# Patient Record
Sex: FEMALE | Race: WHITE | NOT HISPANIC OR LATINO | Employment: FULL TIME | URBAN - METROPOLITAN AREA
[De-identification: names, ages, dates, MRNs, and addresses within clinical notes are randomized per-mention and may not be internally consistent; named-entity substitution may affect disease eponyms.]

---

## 2020-08-10 RX ORDER — MELOXICAM 7.5 MG/1
7.5 TABLET ORAL DAILY
COMMUNITY
End: 2021-08-26

## 2020-08-10 RX ORDER — GABAPENTIN 300 MG/1
300 CAPSULE ORAL 3 TIMES DAILY
COMMUNITY

## 2020-08-10 RX ORDER — CYCLOBENZAPRINE HCL 5 MG
5 TABLET ORAL 3 TIMES DAILY PRN
COMMUNITY
End: 2020-08-13 | Stop reason: SDUPTHER

## 2020-08-13 ENCOUNTER — OFFICE VISIT (OUTPATIENT)
Dept: OBSTETRICS AND GYNECOLOGY | Facility: CLINIC | Age: 65
End: 2020-08-13
Payer: MEDICARE

## 2020-08-13 VITALS — HEIGHT: 69 IN | BODY MASS INDEX: 30.81 KG/M2 | WEIGHT: 208 LBS

## 2020-08-13 DIAGNOSIS — Z12.31 BREAST CANCER SCREENING BY MAMMOGRAM: ICD-10-CM

## 2020-08-13 DIAGNOSIS — Z01.419 ENCOUNTER FOR WELL WOMAN EXAM WITH ROUTINE GYNECOLOGICAL EXAM: Primary | ICD-10-CM

## 2020-08-13 PROCEDURE — G0101 PR CA SCREEN;PELVIC/BREAST EXAM: ICD-10-PCS | Mod: S$GLB,,, | Performed by: OBSTETRICS & GYNECOLOGY

## 2020-08-13 PROCEDURE — G0101 CA SCREEN;PELVIC/BREAST EXAM: HCPCS | Mod: S$GLB,,, | Performed by: OBSTETRICS & GYNECOLOGY

## 2020-08-13 RX ORDER — LISINOPRIL 10 MG/1
10 TABLET ORAL DAILY
COMMUNITY
Start: 2020-07-21

## 2020-08-13 RX ORDER — ESOMEPRAZOLE MAGNESIUM 40 MG/1
40 CAPSULE, DELAYED RELEASE ORAL
COMMUNITY
End: 2022-05-09 | Stop reason: ALTCHOICE

## 2020-08-13 NOTE — PROGRESS NOTES
CC: Well Woman -hysterectomy with BSO    HPI:  Patient is a 65 y.o.   who presents for her well woman exam today.  History reviewed with patient and changes noted.  Patient without complaints or concerns today.    Past Medical History:   Diagnosis Date    Arthritis     GERD (gastroesophageal reflux disease)     HTN (hypertension)     Pancytopenia        Past Surgical History:   Procedure Laterality Date    APPENDECTOMY       SECTION      TOTAL ABDOMINAL HYSTERECTOMY W/ BILATERAL SALPINGOOPHORECTOMY      2nd to fibroids    TOTAL KNEE ARTHROPLASTY Right     knee replacement       Social History     Tobacco Use    Smoking status: Never Smoker    Smokeless tobacco: Never Used   Substance Use Topics    Alcohol use: Not Currently    Drug use: Not on file       Family History   Problem Relation Age of Onset    Breast cancer Mother 56    Hypertension Mother     Diabetes Mother        Review of patient's allergies indicates:   Allergen Reactions    Pcn [penicillins]          Current Outpatient Medications:     gabapentin (NEURONTIN) 300 MG capsule, Take 300 mg by mouth 3 (three) times daily., Disp: , Rfl:     meloxicam (MOBIC) 7.5 MG tablet, Take 7.5 mg by mouth once daily., Disp: , Rfl:     esomeprazole (NEXIUM) 40 MG capsule, Take 40 mg by mouth., Disp: , Rfl:     lisinopriL 10 MG tablet, Take 10 mg by mouth once daily., Disp: , Rfl:     OB History        2    Para   2    Term   2            AB        Living   2       SAB        TAB        Ectopic        Multiple        Live Births   2                  GYN HX:    HX ABNL PAPS:  no abnormals    HX OF STDS:  none    HRT USE: never used    HYSTERECTOMY SINCE:  for fibroids with BSO    HEALTH MAINTENANCE:  (PCP-Primary Doctor No)    LAST ANNUAL/ PAP:   July 3, 2019       LAST PAP:  neg    LAST MMG:  2020 normal at [facility]     LAST LABS:  just recently with specialist    LAST COLON: - neg- Q 10 yrs- w/ pt cant remember  "name of MD    LAST DEXA: 2011 normal at [facility]    ROS:  Review of Systems   Constitutional: Negative for activity change, appetite change, chills, fatigue, fever and unexpected weight change.   Respiratory: Negative for cough and shortness of breath.    Cardiovascular: Negative for chest pain and leg swelling.   Gastrointestinal: Negative for abdominal pain, bloating, blood in stool, constipation, diarrhea, nausea and vomiting.   Endocrine: Negative for hair loss and hot flashes.   Genitourinary: Negative for decreased libido, dyspareunia, dysuria, flank pain, frequency, genital sores, hematuria, pelvic pain, urgency, vaginal bleeding, vaginal discharge, vaginal pain, urinary incontinence, vaginal dryness and vaginal odor.   Musculoskeletal: Negative for arthralgias and joint swelling.   Integumentary:  Negative for rash, acne, mole/lesion, breast mass, nipple discharge, breast skin changes and breast tenderness.   Neurological: Negative for headaches.   Psychiatric/Behavioral: Negative for depression and sleep disturbance. The patient is not nervous/anxious.    Breast: Negative for asymmetry, lump, mass, nipple discharge, skin changes and tenderness      VITALS:  No LMP recorded. Patient has had a hysterectomy.  Vitals:    08/13/20 1516   Weight: 94.3 kg (208 lb)   Height: 5' 9" (1.753 m)     Body mass index is 30.72 kg/m².     PHYSICAL EXAM:  Physical Exam:   Constitutional: She is oriented to person, place, and time. She appears well-developed and well-nourished. No distress.    HENT:   Head: Normocephalic and atraumatic.     Neck: No thyroid mass present.    Cardiovascular: Normal rate.     Pulmonary/Chest: Effort normal. No respiratory distress. She exhibits no deformity. Right breast exhibits no inverted nipple, no mass, no nipple discharge, no skin change, no tenderness, no bleeding and no swelling. Left breast exhibits no inverted nipple, no mass, no nipple discharge, no skin change, no tenderness, no " bleeding and no swelling. Breasts are symmetrical.        Abdominal: Soft. Normal appearance. She exhibits no distension. There is no abdominal tenderness.     Genitourinary:    Vagina normal.      Pelvic exam was performed with patient supine.   There is no rash, tenderness, lesion or injury on the right labia. There is no rash, tenderness, lesion or injury on the left labia. Uterus is absent. Right adnexum displays no mass, no tenderness and no fullness. Left adnexum displays no mass, no tenderness and no fullness. No erythema, tenderness, bleeding, rectocele, cystocele or unspecified prolapse of vaginal walls in the vagina.    No foreign body in the vagina.      No signs of injury in the vagina.   Vaginal cuff normal.Labial bartholins normal.Cervix exhibits absence. negative for vaginal discharge              Neurological: She is alert and oriented to person, place, and time.    Skin: Skin is warm and dry. No lesion and no rash noted.    Psychiatric: She has a normal mood and affect. Her speech is normal and behavior is normal. Judgment and thought content normal.     *female chaperone present for exam    ASSESSMENT and PLAN:  Encounter for well woman exam with routine gynecological exam    Breast cancer screening by mammogram       FOLLOWUP:  Follow up in about 1 year (around 8/13/2021) for wwe.     COUNSELING:  Patient was counseled today on A.C.S. Pap guidelines and recommendations for yearly pelvic exam, monthly self breast exams, annual mammograms, and screening colonoscopy starting at age 50. Encouraged patient to see her PCP for other health maintenance.

## 2021-02-11 ENCOUNTER — IMMUNIZATION (OUTPATIENT)
Dept: HEMATOLOGY/ONCOLOGY | Facility: CLINIC | Age: 66
End: 2021-02-11
Payer: MEDICARE

## 2021-02-11 DIAGNOSIS — Z23 NEED FOR VACCINATION: Primary | ICD-10-CM

## 2021-02-11 PROCEDURE — 0001A COVID-19, MRNA, LNP-S, PF, 30 MCG/0.3 ML DOSE VACCINE: CPT | Mod: S$GLB,,, | Performed by: FAMILY MEDICINE

## 2021-02-11 PROCEDURE — 91300 COVID-19, MRNA, LNP-S, PF, 30 MCG/0.3 ML DOSE VACCINE: ICD-10-PCS | Mod: S$GLB,,, | Performed by: FAMILY MEDICINE

## 2021-02-11 PROCEDURE — 91300 COVID-19, MRNA, LNP-S, PF, 30 MCG/0.3 ML DOSE VACCINE: CPT | Mod: S$GLB,,, | Performed by: FAMILY MEDICINE

## 2021-02-11 PROCEDURE — 0001A COVID-19, MRNA, LNP-S, PF, 30 MCG/0.3 ML DOSE VACCINE: ICD-10-PCS | Mod: S$GLB,,, | Performed by: FAMILY MEDICINE

## 2021-03-04 ENCOUNTER — IMMUNIZATION (OUTPATIENT)
Dept: HEMATOLOGY/ONCOLOGY | Facility: CLINIC | Age: 66
End: 2021-03-04
Payer: MEDICARE

## 2021-03-04 DIAGNOSIS — Z23 NEED FOR VACCINATION: Primary | ICD-10-CM

## 2021-03-04 PROCEDURE — 0002A COVID-19, MRNA, LNP-S, PF, 30 MCG/0.3 ML DOSE VACCINE: CPT | Mod: CV19,S$GLB,, | Performed by: FAMILY MEDICINE

## 2021-03-04 PROCEDURE — 91300 COVID-19, MRNA, LNP-S, PF, 30 MCG/0.3 ML DOSE VACCINE: ICD-10-PCS | Mod: S$GLB,,, | Performed by: FAMILY MEDICINE

## 2021-03-04 PROCEDURE — 0002A COVID-19, MRNA, LNP-S, PF, 30 MCG/0.3 ML DOSE VACCINE: ICD-10-PCS | Mod: CV19,S$GLB,, | Performed by: FAMILY MEDICINE

## 2021-03-04 PROCEDURE — 91300 COVID-19, MRNA, LNP-S, PF, 30 MCG/0.3 ML DOSE VACCINE: CPT | Mod: S$GLB,,, | Performed by: FAMILY MEDICINE

## 2021-07-01 ENCOUNTER — TELEPHONE (OUTPATIENT)
Dept: OBSTETRICS AND GYNECOLOGY | Facility: CLINIC | Age: 66
End: 2021-07-01

## 2021-07-08 ENCOUNTER — TELEPHONE (OUTPATIENT)
Dept: PULMONOLOGY | Facility: CLINIC | Age: 66
End: 2021-07-08

## 2021-07-08 DIAGNOSIS — R09.1 PLEURISY: Primary | ICD-10-CM

## 2021-08-26 ENCOUNTER — OFFICE VISIT (OUTPATIENT)
Dept: OBSTETRICS AND GYNECOLOGY | Facility: CLINIC | Age: 66
End: 2021-08-26
Payer: MEDICARE

## 2021-08-26 VITALS
BODY MASS INDEX: 31.52 KG/M2 | HEIGHT: 68 IN | HEART RATE: 73 BPM | DIASTOLIC BLOOD PRESSURE: 75 MMHG | WEIGHT: 208 LBS | SYSTOLIC BLOOD PRESSURE: 130 MMHG

## 2021-08-26 DIAGNOSIS — Z78.0 ENCOUNTER FOR OSTEOPOROSIS SCREENING IN ASYMPTOMATIC POSTMENOPAUSAL PATIENT: ICD-10-CM

## 2021-08-26 DIAGNOSIS — Z12.31 BREAST CANCER SCREENING BY MAMMOGRAM: Primary | ICD-10-CM

## 2021-08-26 DIAGNOSIS — N95.2 ATROPHIC VAGINITIS: ICD-10-CM

## 2021-08-26 DIAGNOSIS — N39.0 RECURRENT UTI: ICD-10-CM

## 2021-08-26 DIAGNOSIS — Z13.820 ENCOUNTER FOR OSTEOPOROSIS SCREENING IN ASYMPTOMATIC POSTMENOPAUSAL PATIENT: ICD-10-CM

## 2021-08-26 PROCEDURE — 99213 PR OFFICE/OUTPT VISIT, EST, LEVL III, 20-29 MIN: ICD-10-PCS | Mod: 25,S$GLB,, | Performed by: OBSTETRICS & GYNECOLOGY

## 2021-08-26 PROCEDURE — G0101 CA SCREEN;PELVIC/BREAST EXAM: HCPCS | Mod: GZ,S$GLB,, | Performed by: OBSTETRICS & GYNECOLOGY

## 2021-08-26 PROCEDURE — 99213 OFFICE O/P EST LOW 20 MIN: CPT | Mod: 25,S$GLB,, | Performed by: OBSTETRICS & GYNECOLOGY

## 2021-08-26 PROCEDURE — G0101 PR CA SCREEN;PELVIC/BREAST EXAM: ICD-10-PCS | Mod: GZ,S$GLB,, | Performed by: OBSTETRICS & GYNECOLOGY

## 2021-08-26 RX ORDER — CYCLOBENZAPRINE HCL 10 MG
10 TABLET ORAL NIGHTLY
COMMUNITY
Start: 2021-08-10

## 2021-08-26 RX ORDER — LATANOPROST 50 UG/ML
SOLUTION/ DROPS OPHTHALMIC
COMMUNITY
Start: 2021-07-16

## 2021-08-26 RX ORDER — TIZANIDINE 2 MG/1
2 TABLET ORAL EVERY 8 HOURS PRN
COMMUNITY
Start: 2021-06-24 | End: 2022-05-09 | Stop reason: ALTCHOICE

## 2021-08-26 RX ORDER — ATORVASTATIN CALCIUM 40 MG/1
TABLET, FILM COATED ORAL
COMMUNITY
Start: 2021-08-15

## 2021-08-26 RX ORDER — ESTRADIOL 0.1 MG/G
1 CREAM VAGINAL
Qty: 42.5 G | Refills: 4 | Status: SHIPPED | OUTPATIENT
Start: 2021-08-26 | End: 2021-09-25

## 2021-08-26 RX ORDER — FERROUS SULFATE TAB 325 MG (65 MG ELEMENTAL FE) 325 (65 FE) MG
1 TAB ORAL 2 TIMES DAILY
COMMUNITY
Start: 2021-07-07 | End: 2022-05-09 | Stop reason: ALTCHOICE

## 2021-08-26 RX ORDER — OXYCODONE AND ACETAMINOPHEN 7.5; 325 MG/1; MG/1
TABLET ORAL
COMMUNITY
Start: 2021-08-12 | End: 2022-05-09

## 2021-08-26 RX ORDER — TRAMADOL HYDROCHLORIDE 50 MG/1
50 TABLET ORAL EVERY 6 HOURS PRN
COMMUNITY
Start: 2021-08-18

## 2022-02-07 ENCOUNTER — OFFICE VISIT (OUTPATIENT)
Dept: PULMONOLOGY | Facility: CLINIC | Age: 67
End: 2022-02-07
Payer: MEDICARE

## 2022-02-07 VITALS
BODY MASS INDEX: 32.42 KG/M2 | DIASTOLIC BLOOD PRESSURE: 64 MMHG | OXYGEN SATURATION: 95 % | RESPIRATION RATE: 18 BRPM | WEIGHT: 213.19 LBS | SYSTOLIC BLOOD PRESSURE: 154 MMHG

## 2022-02-07 DIAGNOSIS — J94.8 CALCIFIED PLEURAL PLAQUE ON CHEST X-RAY: Primary | ICD-10-CM

## 2022-02-07 PROCEDURE — 99203 OFFICE O/P NEW LOW 30 MIN: CPT | Mod: S$GLB,,, | Performed by: INTERNAL MEDICINE

## 2022-02-07 PROCEDURE — 99203 PR OFFICE/OUTPT VISIT, NEW, LEVL III, 30-44 MIN: ICD-10-PCS | Mod: S$GLB,,, | Performed by: INTERNAL MEDICINE

## 2022-02-07 NOTE — PROGRESS NOTES
Subjective:    Patient Identification:   Patient ID: Silvina Morgan is a 67 y.o. female.    Referring Provider:  Referred by ER for abnormal CT scan     Chief Complaint:  Abnormal CT scan    History of Present Illness:    Silvina Morgan is a 67 y.o. female who presents for the evaluation of above-mentioned.  The patient mentions that there is a chronic spot on her lungs which was found out when she had a PPD flare-up 15 years ago resulting in a chest x-ray.  She never had any respiratory symptoms.  He 90s she had pleurisy and saw several doctors until seen by Dr. Schultz who made a diagnosis of pleurisy and put her on some medications that she does not remember but her symptoms completely went away.  He denies any history of trauma or any procedures performed on the right side of her chest.  He denies any personal history of tuberculosis.  Eight years ago she was thought to have rheumatoid arthritis but it turned out she did not have rheumatoid arthritis but osteoarthritis.  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  She mentioned that she was in an old building at [address] [place] [address] for about 20 years but does not recall definitive asbestos exposure.  She had a preop chest x-ray for some surgical procedure and was found to have suspected pneumothorax and sent to ER.  At that time she had no symptoms.  This was in June of last year and she ended up getting a CT scan of the chest in ER which showed right hemithorax anterior larger than posterior calcified pleural plaque.  She was referred to [facility] for further evaluation of that finding.    Review of Systems:  Review of Systems   Constitutional: Negative for fever, chills, weight loss, weight gain, activity change, appetite change, fatigue, night sweats and weakness.   HENT: Negative for nosebleeds, postnasal drip, rhinorrhea, sinus pressure, sore throat, trouble swallowing, voice change, congestion, ear  pain and hearing loss.    Eyes: Negative for redness and itching.   Respiratory: Negative for cough, hemoptysis, sputum production, choking, chest tightness, shortness of breath, wheezing, orthopnea, previous hospitialization due to pulmonary problems, asthma nighttime symptoms, pleurisy, dyspnea on extertion, use of rescue inhaler and Paroxysmal Nocturnal Dyspnea.    Cardiovascular: Negative for chest pain, palpitations and leg swelling.   Genitourinary: Negative for difficulty urinating and hematuria.   Endocrine: Negative for polydipsia, polyphagia, cold intolerance, heat intolerance and polyuria.    Musculoskeletal: Negative for arthralgias, back pain, gait problem, joint swelling and myalgias.   Skin: Negative for rash.   Gastrointestinal: Negative for nausea, vomiting, abdominal pain, abdominal distention and acid reflux.   Neurological: Negative for dizziness, syncope, weakness, light-headedness and headaches.   Hematological: Negative for adenopathy. Does not bruise/bleed easily and no excessive bruising.   Psychiatric/Behavioral: Negative for confusion and sleep disturbance. The patient is not nervous/anxious.          Allergies:   Review of patient's allergies indicates:   Allergen Reactions    Pcn [penicillins]        Medications:      Past Medical History:      Past Medical History:   Diagnosis Date    Arthritis     GERD (gastroesophageal reflux disease)     HTN (hypertension)     Pancytopenia 2011    Postmenopausal        Family History:      Family History   Problem Relation Age of Onset    Breast cancer Mother 56    Hypertension Mother     Diabetes Mother         Social History:      Past Surgical History:   Procedure Laterality Date    APPENDECTOMY       SECTION      TOTAL ABDOMINAL HYSTERECTOMY W/ BILATERAL SALPINGOOPHORECTOMY      2nd to fibroids    TOTAL KNEE ARTHROPLASTY Right     knee replacement    TOTAL KNEE ARTHROPLASTY Left        Physical Exam:  Vitals:    22  1247   BP: (!) 154/64   Resp: 18     Physical Exam   Constitutional: She is oriented to person, place, and time. She appears not cachectic. No distress.   HENT:   Head: Normocephalic.   Right Ear: External ear normal.   Left Ear: External ear normal.   Nose: Nose normal. No mucosal edema. No polyps.   Mouth/Throat: Oropharynx is clear and moist. Normal dentition. No oropharyngeal exudate.   Neck: No JVD present. No tracheal deviation present. No thyromegaly present.   Cardiovascular: Normal rate, regular rhythm, normal heart sounds and intact distal pulses. Exam reveals no gallop and no friction rub.   No murmur heard.  Pulmonary/Chest: Normal expansion, symmetric chest wall expansion, effort normal and breath sounds normal. No stridor. No respiratory distress. She has no decreased breath sounds. She has no wheezes. She has no rhonchi. She has no rales. Chest wall is not dull to percussion. She exhibits no tenderness. Negative for egophony. Negative for tactile fremitus.   Abdominal: Soft. Bowel sounds are normal. She exhibits no distension and no mass. There is no hepatosplenomegaly. There is no abdominal tenderness. There is no rebound and no guarding. No hernia.   Musculoskeletal:         General: No tenderness, deformity or edema. Normal range of motion.      Cervical back: Normal range of motion and neck supple.   Lymphadenopathy: No supraclavicular adenopathy is present.     She has no cervical adenopathy.     She has no axillary adenopathy.   Neurological: She is alert and oriented to person, place, and time. She has normal reflexes. She displays normal reflexes. No cranial nerve deficit. She exhibits normal muscle tone.   Skin: Skin is warm and dry. No rash noted. She is not diaphoretic. No cyanosis or erythema. No pallor. Nails show no clubbing.   Psychiatric: She has a normal mood and affect. Her behavior is normal. Judgment and thought content normal.            Accessory Clinical Data:      CT scan:  CT  scan of the chest from June 2021 was personally reviewed and findings were dictated under HPI.  Right-sided anterior and posterior, anterior greater than posterior calcified pleural plaque without any parenchymal abnormalities.    PFTs:  PFTs showed normal spirometry and do not meet criteria for obstruction or restriction.  Diffusion capacity is mildly reduced but corrects for alveolar volume.    6MWT:  None    TTE:  None available    Lab data:    All radiographic imaging of the chest, PFT tracings/data, and 6MWT data have been independently reviewed and interpreted unless otherwise specified.     Assessment and Plan:        Problem List Items Addressed This Visit    None     Visit Diagnoses     Calcified pleural plaque on chest x-ray    -  Primary         Unilateral anterior more than posterior calcified pleural plaque is probably a calcific sequelae of old pleurisy/pleuritic reaction based upon given history.    I doubt this is asbestosis.  No parenchymal abnormalities seen on lung windows.  Patient is asymptomatic, there is no further evaluation or treatment indicated.    Reassurance was provided to the patient and she was advised to let future providers not to worry about this asymptomatic incidental finding on her imagings.  I have showed those images to the patient in EMR.    Follow up if symptoms worsen or fail to improve.  Thank you very much for involving me in the care of this patient.  Please do not hesitate to reach me if you have any further questions or concerns.    This note is dictated on M*Modal word recognition program.  There are word recognition mistakes that are occasionally missed on review.

## 2022-05-09 ENCOUNTER — OFFICE VISIT (OUTPATIENT)
Dept: UROLOGY | Facility: CLINIC | Age: 67
End: 2022-05-09
Payer: MEDICARE

## 2022-05-09 VITALS
DIASTOLIC BLOOD PRESSURE: 78 MMHG | WEIGHT: 210 LBS | HEIGHT: 68 IN | RESPIRATION RATE: 16 BRPM | SYSTOLIC BLOOD PRESSURE: 138 MMHG | BODY MASS INDEX: 31.83 KG/M2 | TEMPERATURE: 98 F | HEART RATE: 67 BPM

## 2022-05-09 DIAGNOSIS — N39.0 RECURRENT UTI: Primary | ICD-10-CM

## 2022-05-09 LAB — POC RESIDUAL URINE VOLUME: 0 ML (ref 0–100)

## 2022-05-09 PROCEDURE — 99203 PR OFFICE/OUTPT VISIT, NEW, LEVL III, 30-44 MIN: ICD-10-PCS | Mod: S$GLB,,, | Performed by: NURSE PRACTITIONER

## 2022-05-09 PROCEDURE — 51798 US URINE CAPACITY MEASURE: CPT | Mod: S$GLB,,, | Performed by: NURSE PRACTITIONER

## 2022-05-09 PROCEDURE — 51798 POCT BLADDER SCAN: ICD-10-PCS | Mod: S$GLB,,, | Performed by: NURSE PRACTITIONER

## 2022-05-09 PROCEDURE — 99203 OFFICE O/P NEW LOW 30 MIN: CPT | Mod: S$GLB,,, | Performed by: NURSE PRACTITIONER

## 2022-05-09 RX ORDER — FERROUS GLUCONATE 324(38)MG
1 TABLET ORAL DAILY
COMMUNITY
Start: 2022-04-29

## 2022-05-09 RX ORDER — MELOXICAM 15 MG/1
15 TABLET ORAL DAILY PRN
COMMUNITY
Start: 2022-04-27

## 2022-05-09 RX ORDER — PANTOPRAZOLE SODIUM 40 MG/1
40 TABLET, DELAYED RELEASE ORAL DAILY
COMMUNITY
Start: 2022-04-08

## 2022-05-09 RX ORDER — FERROUS SULFATE, DRIED 160(50) MG
1 TABLET, EXTENDED RELEASE ORAL 2 TIMES DAILY WITH MEALS
COMMUNITY

## 2022-05-09 RX ORDER — ERGOCALCIFEROL 1.25 MG/1
50000 CAPSULE ORAL
COMMUNITY

## 2022-05-09 RX ORDER — TIMOLOL MALEATE 5 MG/ML
1 SOLUTION/ DROPS OPHTHALMIC 2 TIMES DAILY
COMMUNITY
Start: 2022-04-26

## 2022-05-09 NOTE — PROGRESS NOTES
Subjective:       Patient ID: Silvina Morgan is a 67 y.o. female.    Chief Complaint: Urinary Tract Infection, Urinary Urgency, and Nocturia (X 2)      HPI: 67-year-old female, new to [facility], referred for recurring UTIs.  Patient states he has been having recurring UTIs.  Patient states he prior had 6 is having UTIs over the last year.  Records show that she had a urine culture in February which showed Klebsiella.  Urine culture in March was showed mixed growth.  Patient states when she gets a UTI she will typically have pain with urination, urgency, and odor to the urine.    At this time patient is doing well no complaints.  She denies any pain or burning urination.  Denies any odor urine.  Denies any fever.  Denies any body aches.  Denies any blood in it.  No other urinary complaints at this time.    Patient states she has used estrogen cream in the past with no improvement.       Past Medical History:   Past Medical History:   Diagnosis Date    Arthritis     Colon polyp     GERD (gastroesophageal reflux disease)     Glaucoma     HTN (hypertension)     Mixed hyperlipidemia     Pancytopenia     Postmenopausal     Spinal stenosis     Urinary tract infection        Past Surgical Historical:   Past Surgical History:   Procedure Laterality Date    APPENDECTOMY       SECTION      colonoscopy with polys      TOTAL ABDOMINAL HYSTERECTOMY W/ BILATERAL SALPINGOOPHORECTOMY      2nd to fibroids    TOTAL KNEE ARTHROPLASTY Right     knee replacement    TOTAL KNEE ARTHROPLASTY Left         Medications:   Medication List with Changes/Refills   Current Medications    ATORVASTATIN (LIPITOR) 40 MG TABLET        CALCIUM-VITAMIN D3 (CALCIUM 500 + D) 500 MG-5 MCG (200 UNIT) PER TABLET    Take 1 tablet by mouth 2 (two) times daily with meals.    CYCLOBENZAPRINE (FLEXERIL) 10 MG TABLET    Take 10 mg by mouth nightly.    ERGOCALCIFEROL (VITAMIN D2) 50,000 UNIT CAP    Take 50,000 Units by mouth every  7 days.    ESTRADIOL (ESTRACE) 0.01 % (0.1 MG/GRAM) VAGINAL CREAM    Place 1 g vaginally twice a week.    FERROUS GLUCONATE (FERGON) 324 MG TABLET    Take 1 tablet by mouth once daily.    GABAPENTIN (NEURONTIN) 300 MG CAPSULE    Take 300 mg by mouth 3 (three) times daily.    LATANOPROST 0.005 % OPHTHALMIC SOLUTION        LISINOPRIL 10 MG TABLET    Take 10 mg by mouth once daily.    MELOXICAM (MOBIC) 15 MG TABLET    Take 15 mg by mouth daily as needed.    OXYCODONE-ACETAMINOPHEN (PERCOCET) 7.5-325 MG PER TABLET    TAKE 1 TABLET BY MOUTH EVERY 6 HOURS AS NEEDED FOR PAIN FOR 7 DAYS    PANTOPRAZOLE (PROTONIX) 40 MG TABLET    Take 40 mg by mouth once daily.    TIMOLOL MALEATE 0.5% (TIMOPTIC) 0.5 % DROP    Place 1 drop into both eyes 2 (two) times daily.    TIZANIDINE (ZANAFLEX) 2 MG TABLET    Take 2 mg by mouth every 8 (eight) hours as needed. FOR MUSCLE SPASM    TRAMADOL (ULTRAM) 50 MG TABLET    Take 50 mg by mouth every 6 (six) hours as needed.   Discontinued Medications    ESOMEPRAZOLE (NEXIUM) 40 MG CAPSULE    Take 40 mg by mouth.    FEROSUL 325 MG (65 MG IRON) TAB TABLET    Take 1 tablet by mouth 2 (two) times daily.        Past Social History:   Social History     Socioeconomic History    Marital status:    Tobacco Use    Smoking status: Never Smoker    Smokeless tobacco: Never Used   Substance and Sexual Activity    Alcohol use: Not Currently    Drug use: Never    Sexual activity: Not Currently       Allergies:   Review of patient's allergies indicates:   Allergen Reactions    Pcn [penicillins]         Family History:   Family History   Problem Relation Age of Onset    Breast cancer Mother 56    Hypertension Mother     Diabetes Mother         Review of Systems:  Review of Systems   Constitutional: Negative for activity change and appetite change.   HENT: Negative for congestion and dental problem.    Respiratory: Negative for chest tightness and shortness of breath.    Cardiovascular: Negative for  chest pain.   Gastrointestinal: Negative for abdominal distention.   Genitourinary: Negative for decreased urine volume, difficulty urinating, dyspareunia, dysuria, enuresis, flank pain, frequency, genital sores, hematuria, pelvic pain and urgency.   Musculoskeletal: Negative for back pain and neck pain.   Allergic/Immunologic: Negative for immunocompromised state.   Neurological: Negative for dizziness.   Hematological: Negative for adenopathy.   Psychiatric/Behavioral: Negative for agitation, behavioral problems and confusion.       Physical Exam:  Physical Exam  Vitals and nursing note reviewed.   Constitutional:       Appearance: She is well-developed.   HENT:      Head: Normocephalic.   Cardiovascular:      Rate and Rhythm: Normal rate and regular rhythm.      Heart sounds: Normal heart sounds.   Pulmonary:      Effort: Pulmonary effort is normal.      Breath sounds: Normal breath sounds.   Abdominal:      General: Bowel sounds are normal.      Palpations: Abdomen is soft.   Skin:     General: Skin is warm and dry.   Neurological:      Mental Status: She is alert and oriented to person, place, and time.       Urinalysis:  Negative leukocytes, negative white blood cells, negative bacteria.    Assessment/Plan:   Recurring UTI:  Will send patient for renal ultrasound.  Discussed maintaining hydration.  Also discussed not holding her bladder.  Patient started notify us for any signs symptoms of infection.    Follow-up pending ultrasound.  Problem List Items Addressed This Visit    None     Visit Diagnoses     Recurrent UTI    -  Primary    Relevant Orders    POCT Bladder Scan    POCT Urinalysis (w/Micro Option)    US Retroperitoneal Complete

## 2022-05-30 ENCOUNTER — TELEPHONE (OUTPATIENT)
Dept: UROLOGY | Facility: CLINIC | Age: 67
End: 2022-05-30
Payer: MEDICARE

## 2022-05-30 NOTE — TELEPHONE ENCOUNTER
Stated she went to urgent care 05/28/22 and was prescribed Cipro and given ABX ineciton. Advised to continue ABX as prescribed and contact office 1-2 days if s/s have not improved. Verbalized understanding.

## 2022-05-30 NOTE — TELEPHONE ENCOUNTER
----- Message from Nel Beyer sent at 5/30/2022  1:32 PM CDT -----  Contact: self  Pt calling to stated she had urgent care visit and another uti that started Saturday.  Pt stated they said it was a bad one and was given a shot and medication.  Pt can be reached at [phone]     Thanks,

## 2022-06-13 ENCOUNTER — TELEPHONE (OUTPATIENT)
Dept: UROLOGY | Facility: CLINIC | Age: 67
End: 2022-06-13
Payer: MEDICARE

## 2022-06-13 NOTE — TELEPHONE ENCOUNTER
Pt notified of US results. She states after a bm, not every time but when she goes to restroom she notes bm on tissue and asked if this could be causing the uti's. I advised it was possible if bm is getting into urethra. I advised she be checked by gyn or pcp for bm leakage.

## 2022-06-13 NOTE — TELEPHONE ENCOUNTER
----- Message from Edd Stark NP sent at 6/13/2022  8:29 AM CDT -----  Ultrasound of the kidneys is normal.    Follow-up for any recurrence of UTI.

## 2022-07-26 RX ORDER — DULOXETIN HYDROCHLORIDE 30 MG/1
CAPSULE, DELAYED RELEASE ORAL
COMMUNITY
Start: 2022-05-19

## 2022-07-26 NOTE — PROGRESS NOTES
FOLLOW UP VISIT- (menop/ hyst with BSO)  Patient Care Team:  Rena Reyes DO as PCP - General (Family Medicine)  Iglesia Willis MD as Consulting Physician (Pulmonary Disease)  Edd Stark NP as Nurse Practitioner (Urology)  Jonathan Dasilva MD (Orthopedic Surgery)  Kong Jung Jr., MD (Physical Medicine and Rehabilitation)  Chadwick De La Rosa MD (Hematology and Oncology)    The patient's last visit with me was on 2021 for wwe    HPI:  Patient is a 67 y.o. who presents for her breast check and followup on atrophic vaginitis. History reviewed with patient. Reports she has had recurrent utis and saw urology and had a neg cysto and kidney eval. Discussed possible daily prophylaxis but has not done that yet.  Pts pcp suggested using estrace cream vaginally to help prevent utis but pt said it didn't help and then noticed that occasionally after a bm, she will several hrs later urinate and wipe and see stool on the tp and was concerned there was a tear/connection between rectum and vagina and bladder. Has not at any other time other than the first time after a bm and has only happened a couple times. Hasnt noticed any dc from vag.    Also has a boil on her left inner thigh that is tender and been there a few days now. Tends to get them recurrently in the same area.     Her hyst was for fibroids in   HRT:  vaginal estrogen but stopped recently  HX ABNL PAPS: none    REVIEW OF PRIOR DATA/ HEALTH MAINTENANCE:  LAST ANNUAL:   2021   LAST LABS- in last few mos with PCP   LAST MMG (screening)- 2021- normal at [facility]    LAST COLONOSCOPY- - neg- Q 10 yrs- Dr at [facility]   LAST DEXA- - osteopenia at [facility]     Past Medical History:   Diagnosis Date    Arthritis     Colon polyp     GERD (gastroesophageal reflux disease)     Glaucoma     HTN (hypertension)     Mixed hyperlipidemia     Pancytopenia 2011    Pleurisy     Post-menopause atrophic vaginitis     HRT     Recurrent UTI     Spinal stenosis     Urinary tract infection      SURGICAL HX:   has a past surgical history that includes Total abdominal hysterectomy w/ bilateral salpingoophorectomy; Appendectomy; Total knee arthroplasty (Right);  section; Total knee arthroplasty (Left); and colonoscopy with polys.    SOCIAL HX:    reports that she has never smoked. She has never used smokeless tobacco. She reports previous alcohol use. She reports that she does not use drugs.    FAMILY HX:   family history includes Breast cancer (age of onset: 56) in her mother; Diabetes in her mother; Hypertension in her mother; No Known Problems in her brother, father, maternal grandfather, maternal grandmother, paternal grandfather, paternal grandmother, and sister. .    ALLERGIES:  Pcn [penicillins]    Current Outpatient Medications:     atorvastatin (LIPITOR) 40 MG tablet, , Disp: , Rfl:     calcium-vitamin D3 (OS-MAULIK 500 + D3) 500 mg-5 mcg (200 unit) per tablet, Take 1 tablet by mouth 2 (two) times daily with meals., Disp: , Rfl:     cyclobenzaprine (FLEXERIL) 10 MG tablet, Take 10 mg by mouth nightly., Disp: , Rfl:     DULoxetine (CYMBALTA) 30 MG capsule, TAKE 1 CAPSULE BY MOUTH ONCE DAILY WITH MEALS INCREASE AFTER 7 DAYS TO 2 DAILY, Disp: , Rfl:     ferrous gluconate (FERGON) 324 MG tablet, Take 1 tablet by mouth once daily., Disp: , Rfl:     gabapentin (NEURONTIN) 300 MG capsule, Take 300 mg by mouth 3 (three) times daily., Disp: , Rfl:     latanoprost 0.005 % ophthalmic solution, , Disp: , Rfl:     lisinopriL 10 MG tablet, Take 10 mg by mouth once daily., Disp: , Rfl:     meloxicam (MOBIC) 15 MG tablet, Take 15 mg by mouth daily as needed., Disp: , Rfl:     pantoprazole (PROTONIX) 40 MG tablet, Take 40 mg by mouth once daily., Disp: , Rfl:     timolol maleate 0.5% (TIMOPTIC) 0.5 % Drop, Place 1 drop into both eyes 2 (two) times daily., Disp: , Rfl:     ergocalciferol (ERGOCALCIFEROL) 50,000 unit Cap, Take 50,000  "Units by mouth every 7 days., Disp: , Rfl:     estradioL (ESTRACE) 0.01 % (0.1 mg/gram) vaginal cream, Place 1 g vaginally twice a week., Disp: 42.5 g, Rfl: 4    sulfamethoxazole-trimethoprim 800-160mg (BACTRIM DS) 800-160 mg Tab, Take 1 tablet by mouth 2 (two) times daily. for 7 days, Disp: 14 tablet, Rfl: 0    traMADoL (ULTRAM) 50 mg tablet, Take 50 mg by mouth every 6 (six) hours as needed., Disp: , Rfl:     ROS:  CONST:  No fever, chills, fatigue or unexpected changes in weight  CV: No chest pain or palpitations  RESP:  No shortness of breath or cough  GI: No abd pain, vomiting, diarrhea, blood in stool, or changes in bowel mvmts  SKIN: No rashes +boil on thigh  MUSCULOSKELETAL: No joint swelling or pain  PSYCH: No changes in mood or insomia  BREASTS: No asymmetry, lumps, pain, nipple discharge, or skin changes  :  +urinary urgency and frequency          No vag dc, itching, odor or dryness          No pelvic pain, dyspareunia, or abnormal vaginal bleeding    VITALS:  Blood pressure 139/85, pulse 66, height 5' 8" (1.727 m), weight 96.1 kg (211 lb 12.8 oz).  Body mass index is 32.2 kg/m².     PHYSICAL EXAM-  APPEARANCE: Well appearing, in no acute distress.  NECK: Neck symmetric without masses or thyromegaly.  CV:  Normal rate  PULM: Normal resp rate, no resp distress, normal resp effort  PSYCH:  Normal mood and affect, cooperative  SKIN: No rashes or abnormal bruising +2cm area on left inner thigh that was swollen and erythematous c/w folliculitis but no fluctuance  LYMPH: No inguinal or axillary adenopathy  ABD: Soft, without tenderness or masses. No palpable hernias or hsm  BREASTS: Symmetrical, no skin changes or lesions. No palpable masses, tenderness, or nipple dc  PELVIC:  VULVA: No lesions. Normal female genitalia.  URETHRAL MEATUS: No masses, no prolapse.  BLADDER/ URETHRA: No masses or suprapubic tenderness  VAGINA/ CUFF: No lesions. No significant cystocele/ rectocele. +atrophic changes +small " amout of pale yellow discharge at the upper vagina and some erythema. No stool in vagina  PELVIS: No masses, tenderness, or fullness on bimanual exam  ANUS/ PERINEUM: Normal tone.  No lesions.  *female chaperone present for entire exam    ASSESSMENT and PLAN:  Breast cancer screening by mammogram  -     Mammo Digital Screening Bilat w/ Jonah; Future; Expected date: 08/09/2022    Atrophic vaginitis    Menopausal state    Folliculitis  -     sulfamethoxazole-trimethoprim 800-160mg (BACTRIM DS) 800-160 mg Tab; Take 1 tablet by mouth 2 (two) times daily. for 7 days  Dispense: 14 tablet; Refill: 0    Recurrent UTI  -     Urine culture    Acute vaginitis   - pt will consider restarting estrace vag cream 2x a week     FOLLOWUP:  1 year for wwe or sooner prn    COUNSELING:  Patient was counseled today on recommendations for yearly pelvic exam, current Pap guidelines, self breast exams, annual screening mammograms, routine screening colonoscopy, and screening bone density. Reviewed calcium and vitamin D supplements and weight bearing exercise to minimize risks.  Encouraged patient to see her PCP for other health maintenance.

## 2022-07-27 ENCOUNTER — OFFICE VISIT (OUTPATIENT)
Dept: OBSTETRICS AND GYNECOLOGY | Facility: CLINIC | Age: 67
End: 2022-07-27
Payer: MEDICARE

## 2022-07-27 VITALS
HEART RATE: 66 BPM | WEIGHT: 211.81 LBS | HEIGHT: 68 IN | DIASTOLIC BLOOD PRESSURE: 85 MMHG | BODY MASS INDEX: 32.1 KG/M2 | SYSTOLIC BLOOD PRESSURE: 139 MMHG

## 2022-07-27 DIAGNOSIS — N39.0 RECURRENT UTI: ICD-10-CM

## 2022-07-27 DIAGNOSIS — N76.0 ACUTE VAGINITIS: ICD-10-CM

## 2022-07-27 DIAGNOSIS — L73.9 FOLLICULITIS: ICD-10-CM

## 2022-07-27 DIAGNOSIS — N95.2 ATROPHIC VAGINITIS: ICD-10-CM

## 2022-07-27 DIAGNOSIS — N95.1 MENOPAUSAL STATE: ICD-10-CM

## 2022-07-27 DIAGNOSIS — Z12.31 BREAST CANCER SCREENING BY MAMMOGRAM: Primary | ICD-10-CM

## 2022-07-27 PROCEDURE — 99214 OFFICE O/P EST MOD 30 MIN: CPT | Mod: S$GLB,,, | Performed by: OBSTETRICS & GYNECOLOGY

## 2022-07-27 PROCEDURE — 99214 PR OFFICE/OUTPT VISIT, EST, LEVL IV, 30-39 MIN: ICD-10-PCS | Mod: S$GLB,,, | Performed by: OBSTETRICS & GYNECOLOGY

## 2022-07-27 RX ORDER — SULFAMETHOXAZOLE AND TRIMETHOPRIM 800; 160 MG/1; MG/1
1 TABLET ORAL 2 TIMES DAILY
Qty: 14 TABLET | Refills: 0 | Status: SHIPPED | OUTPATIENT
Start: 2022-07-27 | End: 2022-08-03

## 2022-07-29 ENCOUNTER — PATIENT MESSAGE (OUTPATIENT)
Dept: OBSTETRICS AND GYNECOLOGY | Facility: CLINIC | Age: 67
End: 2022-07-29
Payer: MEDICARE

## 2022-07-29 LAB — URINE CULTURE, ROUTINE: NORMAL

## 2022-07-29 NOTE — PROGRESS NOTES
Please let pt know her urine culture didn't grow out any bacteria that are associated with uti, however it grew a small amt of bacteria that live on the skin so it may not have been a really good clean catch. She has recurrent utis and sees urology already. I did give her some bactrim for a boil yesterdays or even if there was anything there, that would normally cover it so I wouldn't say she needs to repeat the ua at this point

## 2022-08-01 ENCOUNTER — TELEPHONE (OUTPATIENT)
Dept: OBSTETRICS AND GYNECOLOGY | Facility: CLINIC | Age: 67
End: 2022-08-01
Payer: MEDICARE

## 2023-08-29 NOTE — PROGRESS NOTES
FOLLOW UP VISIT - menop/ hyst with BSO  Patient Care Team:  Rena Reyes DO as PCP - General (Family Medicine)  Iglesia Willis MD as Consulting Physician (Pulmonary Disease)  Edd Stark NP as Nurse Practitioner (Urology)  Jonathan Dasilva MD (Orthopedic Surgery)  Kong Jung Jr., MD (Physical Medicine and Rehabilitation)  Chadwick De La Rosa MD (Hematology and Oncology)    CC:  breast check, menopausal state, urinary urgency, wants rx for contrave  HPI:  Pt is a 68 y.o.  who is here for her breast check and is having some urinary urgency and sometimes urge incontinence for last several months. Not burning and normally is when she is ready to go to bed. No dysuria and had ua about a month ago and was normal and this was already going on prior to that. Causing her not to sleep well and keeps her up at night. Has not been able to identify any other factors like dietary changes, water consumption, caffeine, etc that makes it better or  worse.  Was put on macrodantin qhs as prophylaxis for recurrent utis but hasnt had one recently. Also wants a rx for contrave to help her lose weight    The patient's last visit with me was on 2022 for followup    REVIEW OF PRIOR DATA/ HEALTH MAINTENANCE:  LAST ANNUAL:   2021   LAST LABS- up to date with PCP    LAST MMG (screening)- 2022- normal at [facility]     LAST COLONOSCOPY- - by unknown Dr Ana key 10 yrs (neg)   LAST DEXA- - osteopenia at [facility]     HX ABNL PAPS: none    HYST for fibroids in   HRT:  used vaginal atrophy meds  in the past    Past Medical History:   Diagnosis Date    Arthritis     Colon polyp     GERD (gastroesophageal reflux disease)     Glaucoma     HTN (hypertension)     Mixed hyperlipidemia     Pancytopenia     Pleurisy     Post-menopause atrophic vaginitis     HRT    Recurrent UTI     Spinal stenosis     Urinary tract infection      SURGICAL HX:   has a past surgical history that includes Total  abdominal hysterectomy w/ bilateral salpingoophorectomy; Appendectomy; Total knee arthroplasty (Right);  section; Total knee arthroplasty (Left); and colonoscopy with polys.  Family, obstetric, and social history reviewed and updated    Current Outpatient Medications   Medication Instructions    atorvastatin (LIPITOR) 40 MG tablet No dose, route, or frequency recorded.    calcium-vitamin D3 (OS-MAULIK 500 + D3) 500 mg-5 mcg (200 unit) per tablet 1 tablet, Oral, 2 times daily with meals    cloNIDine (CATAPRES) 0.1 MG tablet TAKE 1 TABLET BY MOUTH EVERY 8 HOURS AS NEEDED FOR SYSTOLIC BLOOD PRESSURE GREATER THAN 180 OR DIASTOLIC BLOOD PRESSURE GREATER THAN 100    cyclobenzaprine (FLEXERIL) 10 mg, Oral, Nightly    dorzolamide-timolol 2-0.5% (COSOPT) 22.3-6.8 mg/mL ophthalmic solution 1 drop, Both Eyes, 2 times daily    DULoxetine (CYMBALTA) 30 MG capsule TAKE 1 CAPSULE BY MOUTH ONCE DAILY WITH MEALS INCREASE AFTER 7 DAYS TO 2 DAILY    ergocalciferol (ERGOCALCIFEROL) 50,000 Units, Oral, Every 7 days    estradioL (ESTRACE) 1 g, Vaginal, Twice weekly    ferrous gluconate (FERGON) 324 MG tablet 1 tablet, Oral, Daily    gabapentin (NEURONTIN) 300 mg, Oral, 3 times daily    latanoprost 0.005 % ophthalmic solution No dose, route, or frequency recorded.    lisinopriL 10 mg, Oral, Daily    meloxicam (MOBIC) 15 mg, Oral, Daily PRN    naltrexone-bupropion (CONTRAVE) 8-90 mg TbSR Week 1- one pill in am Week 2- one pill in am, one pill in pm Week 3- 2 pills in am, one pill in pm Week 4 and beyond- 2 pills in am , 2 pills in pm    nitrofurantoin (MACRODANTIN) 50 mg, Oral    pantoprazole (PROTONIX) 40 mg, Oral, Daily    timolol maleate 0.5% (TIMOPTIC) 0.5 % Drop 1 drop, Both Eyes, 2 times daily    traMADoL (ULTRAM) 50 mg, Oral, Every 6 hours PRN     ALLERGIES: Pcn [penicillins]    ROS:  CONST:  No fever, chills, fatigue +difficulty losing weight  CV: No chest pain or palpitations   RESP:  No shortness of breath or cough   GI: No  abd pain, vomiting, diarrhea, blood in stool, or changes in bowel mvmts   SKIN: No rashes or lesions  MUSCULOSKELETAL: No joint swelling or pain   PSYCH: No changes in mood or insomia   BREASTS: No asymmetry, lumps, pain, nipple discharge, or skin changes   :  No dysuria, frequency, hematuria +urgency +incontinence          No vag dc, itching, odor or dryness           No pelvic pain, dyspareunia, or abnormal vaginal bleeding     VITALS:  Blood pressure 119/74, weight 101.1 kg (222 lb 12.8 oz).  Body mass index is 33.88 kg/m².    PHYSICAL EXAM-  APPEARANCE: Well appearing, in no acute distress.   NECK: Neck symmetric   CV:  Normal rate   PULM: Normal resp rate, no resp distress, normal resp effort   PSYCH:  Normal mood and affect, cooperative   SKIN: No rashes, lesions, or abnormal bruising   LYMPH: No inguinal or axillary adenopathy   ABD: Soft, without tenderness or masses.    BREAST: Symmetrical, no nipple changes, no skin changes, No palpable masses   PELVIC:  VULVA: Normal female genitalia. No lesions.   URETHRAL MEATUS: No masses, no significant prolapse.  BLADDER/ URETHRA: No masses +suprapubic tenderness  VAGINA/ CUFF: No lesions. +atrophic changes. No discharge   PELVIS: No masses, tenderness, or fullness on bimanual exam   ANUS/ PERINEUM: Normal tone.  No lesions.     *female chaperone present for entire exam     ASSESSMENT/ PLAN:  Urinary urgency  -     Urinalysis  -     Urine culture  -gave a sample of [id number] to see if it helps her    Abnormal weight gain  -     naltrexone-bupropion (CONTRAVE) 8-90 mg TbSR; Week 1- one pill in am Week 2- one pill in am, one pill in pm Week 3- 2 pills in am, one pill in pm Week 4 and beyond- 2 pills in am , 2 pills in pm  Dispense: 120 tablet; Refill: 1    Menopausal state    Breast cancer screening by mammogram  -     Mammo Digital Screening Meng w/ Jonah; Future; Expected date: 09/13/2023    Encounter for osteoporosis screening in asymptomatic postmenopausal  patient  -     DXA Bone Density Axial Skeleton 1 or more sites; Future; Expected date: 09/13/2023    Urge incontinence    Suprapubic tenderness      FOLLOWUP:  WWE or sooner prn

## 2023-08-30 ENCOUNTER — OFFICE VISIT (OUTPATIENT)
Dept: OBSTETRICS AND GYNECOLOGY | Facility: CLINIC | Age: 68
End: 2023-08-30
Payer: MEDICARE

## 2023-08-30 VITALS
BODY MASS INDEX: 33.88 KG/M2 | DIASTOLIC BLOOD PRESSURE: 74 MMHG | WEIGHT: 222.81 LBS | SYSTOLIC BLOOD PRESSURE: 119 MMHG

## 2023-08-30 DIAGNOSIS — R39.15 URINARY URGENCY: Primary | ICD-10-CM

## 2023-08-30 DIAGNOSIS — R63.5 ABNORMAL WEIGHT GAIN: ICD-10-CM

## 2023-08-30 DIAGNOSIS — Z13.820 ENCOUNTER FOR OSTEOPOROSIS SCREENING IN ASYMPTOMATIC POSTMENOPAUSAL PATIENT: ICD-10-CM

## 2023-08-30 DIAGNOSIS — Z12.31 BREAST CANCER SCREENING BY MAMMOGRAM: ICD-10-CM

## 2023-08-30 DIAGNOSIS — Z78.0 ENCOUNTER FOR OSTEOPOROSIS SCREENING IN ASYMPTOMATIC POSTMENOPAUSAL PATIENT: ICD-10-CM

## 2023-08-30 DIAGNOSIS — N95.1 MENOPAUSAL STATE: ICD-10-CM

## 2023-08-30 DIAGNOSIS — R10.819 SUPRAPUBIC TENDERNESS: ICD-10-CM

## 2023-08-30 DIAGNOSIS — N39.41 URGE INCONTINENCE: ICD-10-CM

## 2023-08-30 PROCEDURE — 99214 OFFICE O/P EST MOD 30 MIN: CPT | Mod: S$GLB,,, | Performed by: OBSTETRICS & GYNECOLOGY

## 2023-08-30 PROCEDURE — 99214 PR OFFICE/OUTPT VISIT, EST, LEVL IV, 30-39 MIN: ICD-10-PCS | Mod: S$GLB,,, | Performed by: OBSTETRICS & GYNECOLOGY

## 2023-08-30 RX ORDER — DORZOLAMIDE HYDROCHLORIDE AND TIMOLOL MALEATE 20; 5 MG/ML; MG/ML
1 SOLUTION/ DROPS OPHTHALMIC 2 TIMES DAILY
COMMUNITY
Start: 2023-08-18

## 2023-08-30 RX ORDER — CLONIDINE HYDROCHLORIDE 0.1 MG/1
TABLET ORAL
COMMUNITY
Start: 2023-08-22

## 2023-08-30 RX ORDER — NITROFURANTOIN MACROCRYSTALS 50 MG/1
50 CAPSULE ORAL
COMMUNITY
Start: 2023-08-25

## 2023-09-02 LAB — URINE CULTURE, ROUTINE: NORMAL

## 2023-09-05 NOTE — PROGRESS NOTES
Please let pt know her urine culture only shows some bacteria from the skin so I would recommend she touch base with her urologist on the urinary urgency

## 2023-09-06 ENCOUNTER — TELEPHONE (OUTPATIENT)
Dept: OBSTETRICS AND GYNECOLOGY | Facility: CLINIC | Age: 68
End: 2023-09-06
Payer: MEDICARE

## 2023-09-06 NOTE — TELEPHONE ENCOUNTER
Spoke with patient. Two pt identifiers confirmed. Notified patient of her urine results. Patient verbalized understanding.

## 2023-09-06 NOTE — TELEPHONE ENCOUNTER
----- Message from Keturah De La Cruz MD sent at 9/5/2023  5:04 PM CDT -----  Please let pt know her urine culture only shows some bacteria from the skin so I would recommend she touch base with her urologist on the urinary urgency

## 2023-09-07 ENCOUNTER — TELEPHONE (OUTPATIENT)
Dept: OBSTETRICS AND GYNECOLOGY | Facility: CLINIC | Age: 68
End: 2023-09-07
Payer: MEDICARE

## 2023-09-07 NOTE — TELEPHONE ENCOUNTER
----- Message from Natalia Foley sent at 9/7/2023  1:53 PM CDT -----  States she had a pamphlet for appetite at the Office. States she asked Dr De La Cruz, if she could try it. Its not at the pharmacy. States she can't find the pamphlet, so she doesn't know the name of the medicine. Please call pt [phone]. Thank you       [id number] - [place], [place] - [address]  [address] [facility] [address]  [address]  [place] [address]  Phone: [phone] Fax: [phone]

## 2023-11-06 ENCOUNTER — TELEPHONE (OUTPATIENT)
Dept: OBSTETRICS AND GYNECOLOGY | Facility: CLINIC | Age: 68
End: 2023-11-06
Payer: MEDICARE

## 2023-11-06 NOTE — TELEPHONE ENCOUNTER
Returned call to patient. Confirmed that mammo order needed to be sent to [facility]-. Mammo and dexa scan orders were reprinted and faxed.

## 2023-11-06 NOTE — TELEPHONE ENCOUNTER
----- Message from Colette Slater sent at 11/6/2023  3:18 PM CST -----  Contact: ANN TORREZ [[id number]]  ..Type:  Patient Requesting Call    Who Called: ANN TORREZ [[id number]]  Does the patient know what this is regarding?: Pt needs mammo orders faxed   Would the patient rather a call back or a response via [facility]? call  Best Call Back Number: .[phone] (home)   Additional Information:

## 2024-01-17 ENCOUNTER — TELEPHONE (OUTPATIENT)
Dept: OBSTETRICS AND GYNECOLOGY | Facility: CLINIC | Age: 69
End: 2024-01-17
Payer: MEDICARE

## 2024-01-17 NOTE — TELEPHONE ENCOUNTER
----- Message from Keturah De La Cruz MD sent at 1/17/2024 12:33 PM CST -----  Please call pt and let her know that her bone density report shows she has osteopenia and her results are stable from the last time.

## 2024-05-29 ENCOUNTER — TELEPHONE (OUTPATIENT)
Dept: OBSTETRICS AND GYNECOLOGY | Facility: CLINIC | Age: 69
End: 2024-05-29
Payer: MEDICARE

## 2024-05-29 NOTE — TELEPHONE ENCOUNTER
----- Message from Silvia Tate sent at 5/29/2024 10:44 AM CDT -----  Contact: Pricilla with [facility] Sharan Gee  Type: Staff Message    Caller: Pricilla with [facility] Sharan Gee  Call Back Number: [phone]  Nature of the Call: Fax the bone density report [phone]  Additional Information: na

## 2024-06-04 ENCOUNTER — TELEPHONE (OUTPATIENT)
Dept: OBSTETRICS AND GYNECOLOGY | Facility: CLINIC | Age: 69
End: 2024-06-04
Payer: MEDICARE

## 2024-06-04 NOTE — TELEPHONE ENCOUNTER
----- Message from Amber Juan sent at 6/4/2024  1:13 PM CDT -----  Type:  Needs Medical Advice    Who Called: Pricilla roy/ Dr [facility]   Symptoms (please be specific): -   How long has patient had these symptoms:  -  Pharmacy name and phone #:  -  Would the patient rather a call back or a response via [facility]?    Best Call Back Number: [phone]  Additional Information:  says their ofc still has not  received pts bone density results please fax to [phone]

## 2024-08-29 ENCOUNTER — TELEPHONE (OUTPATIENT)
Dept: OBSTETRICS AND GYNECOLOGY | Facility: CLINIC | Age: 69
End: 2024-08-29
Payer: MEDICARE

## 2024-08-29 NOTE — TELEPHONE ENCOUNTER
----- Message from Carlos Driscoll LPN sent at 8/28/2024  3:54 PM CDT -----    ----- Message -----  From: Amber Martinez  Sent: 8/28/2024   1:40 PM CDT  To: Jono CHAIREZ Staff    Type:  Needs Medical Advice    Who Called: Silvina Morgan    Symptoms (please be specific): -   How long has patient had these symptoms:  -  Pharmacy name and phone #:  -  Would the patient rather a call back or a response via [facility]?    Best Call Back Number: [phone]    Additional Information:  pt needs to RS upcoming appt, please call please call

## 2024-11-01 ENCOUNTER — TELEPHONE (OUTPATIENT)
Dept: OBSTETRICS AND GYNECOLOGY | Facility: CLINIC | Age: 69
End: 2024-11-01
Payer: MEDICARE

## 2024-12-04 NOTE — PROGRESS NOTES
CC:  WELL WOMAN (menop/ hyst with BSO)  Patient Care Team:  Erica Martinez MD as PCP - General (Internal Medicine)  Iglesia Willis MD as Consulting Physician (Pulmonary Disease)  Edd Stark NP as Nurse Practitioner (Urology)  Jonathan Dasilva MD (Orthopedic Surgery)  Kong Jung Jr., MD (Physical Medicine and Rehabilitation)  Chadwick De La Rosa MD (Hematology and Oncology)    Last visit with me was on  2023 for a problem visit    HPI:  Patient is a 69 y.o. who presents for her well woman exam today.  History reviewed with patient.   Patient is without complaints or concerns today. Doing well with daily macrobid and only had one uti earlier in the year. Was going to try contrave but was $800 with her insurance so didn't get it    Her hyst was for fibroids in    HRT:  never used systemic hrt  HX ABNL PAPS: none    REVIEW OF PRIOR DATA/ HEALTH MAINTENANCE:  LAST ANNUAL:   2021    LAST MMG- 2023-  [facility]     LAST COLONOSCOPY- - by unknown Dr Ana key 10 yrs (neg)   LAST DEXA- - osteopenia at [facility]     Past Medical History:   Diagnosis Date    Arthritis     Colon polyp     GERD (gastroesophageal reflux disease)     Glaucoma     HTN (hypertension)     Mixed hyperlipidemia     Pancytopenia     Pleurisy     Post-menopause atrophic vaginitis     HRT    Recurrent UTI     Spinal stenosis     Urinary tract infection      SURGICAL HX:   has a past surgical history that includes Total abdominal hysterectomy w/ bilateral salpingoophorectomy; Appendectomy; Total knee arthroplasty (Right);  section; Total knee arthroplasty (Left); and colonoscopy with polys.    SOCIAL HX:    reports that she has never smoked. She has never used smokeless tobacco. She reports that she does not currently use alcohol. She reports that she does not use drugs.    Current Outpatient Medications   Medication Instructions    amLODIPine (NORVASC) 5 MG tablet 1 tablet, Every morning     "atorvastatin (LIPITOR) 40 MG tablet No dose, route, or frequency recorded.    calcium-vitamin D3 (OS-MAULIK 500 + D3) 500 mg-5 mcg (200 unit) per tablet 1 tablet, 2 times daily with meals    cloNIDine (CATAPRES) 0.1 MG tablet TAKE 1 TABLET BY MOUTH EVERY 8 HOURS AS NEEDED FOR SYSTOLIC BLOOD PRESSURE GREATER THAN 180 OR DIASTOLIC BLOOD PRESSURE GREATER THAN 100    dorzolamide-timolol 2-0.5% (COSOPT) 22.3-6.8 mg/mL ophthalmic solution 1 drop, 2 times daily    DULoxetine (CYMBALTA) 30 MG capsule TAKE 1 CAPSULE BY MOUTH ONCE DAILY WITH MEALS INCREASE AFTER 7 DAYS TO 2 DAILY    ergocalciferol (ERGOCALCIFEROL) 50,000 unit Cap 1 capsule, Every 7 days    esomeprazole (NEXIUM) 40 MG capsule 1 capsule, Every morning    ferrous gluconate (FERGON) 324 MG tablet 1 tablet, Every other day    gabapentin (NEURONTIN) 300 mg, 3 times daily    latanoprost 0.005 % ophthalmic solution No dose, route, or frequency recorded.    linaCLOtide (LINZESS) 145 mcg Cap capsule TAKE ONE CAPSULE BY MOUTH ONCE DAILY ON AN EMPTY STOMACH, 30 MINUTES BEFORE A MEAL    lisinopriL 10 MG tablet 1 tablet, Daily    meloxicam (MOBIC) 15 mg, Daily PRN    nitrofurantoin (MACRODANTIN) 50 mg    pantoprazole (PROTONIX) 40 mg, Daily    POLYTUSSIN DM,PYRILAMINE, 12.5-5-7.5 mg/5 mL Liqd TAKE 10 ML BY MOUTH EVERY 6 HOURS AS NEEDED FOR COUGH    semaglutide, weight loss, (WEGOVY) 0.25 mg/0.5 mL PnIj 1 pen injector subcutaneously once a week . Call office for next dose prescription when you are on the 4th dose of this prescription.  USE Rx DISCOUNT CARD: $1456.21, BIN:[id number], TANIAN:LISA, Group:SAL, ID:[id number]    timolol maleate 0.5% (TIMOPTIC) 0.5 % Drop 1 drop, 2 times daily     VITALS:  Blood pressure 135/80, height 5' 8" (1.727 m), weight 98 kg (216 lb).  Body mass index is 32.84 kg/m².     PHYSICAL EXAM-  APPEARANCE: Well appearing, in no acute distress.   NECK: Neck symmetric   CV/PULM: No resp distress, normal resp effort   PSYCH:  Normal mood and affect, " cooperative   SKIN: No rashes, lesions, or abnormal bruising   ABD: Soft, without tenderness or masses.    BREAST: No skin changes or nipple dc.  No palpable masses or tenderness  PELVIC:  VULVA: Normal female genitalia. No lesions.   URETHRAL MEATUS: No masses, no significant prolapse.  BLADDER/ URETHRA: No masses or suprapubic tenderness   VAGINA/ CUFF: No lesions. +atrophic changes. No discharge   PELVIS: No masses, tenderness, or fullness on bimanual exam   ANUS/ PERINEUM: Normal tone.  No lesions.          *patient verbally consented for exam and female chaperone present for entire exam     ASSESSMENT and PLAN:  Encounter for well woman exam with routine gynecological exam    Breast cancer screening by mammogram  -     Mammo Digital Screening Bilat w/ Jonah; Future; Expected date: 12/18/2024    Menopausal state       FOLLOWUP:  1 year for wwe or sooner prn    COUNSELING:  Patient was counseled today on recommendations for yearly pelvic exam, current Pap guidelines, self breast exams, annual screening mammograms, routine screening colonoscopy, and screening bone density. Reviewed calcium and vitamin D supplements and weight bearing exercise to minimize risks.  Encouraged patient to see her PCP for other health maintenance.

## 2024-12-05 ENCOUNTER — OFFICE VISIT (OUTPATIENT)
Dept: OBSTETRICS AND GYNECOLOGY | Facility: CLINIC | Age: 69
End: 2024-12-05
Payer: MEDICARE

## 2024-12-05 VITALS
HEIGHT: 68 IN | SYSTOLIC BLOOD PRESSURE: 135 MMHG | DIASTOLIC BLOOD PRESSURE: 80 MMHG | BODY MASS INDEX: 32.74 KG/M2 | WEIGHT: 216 LBS

## 2024-12-05 DIAGNOSIS — N95.1 MENOPAUSAL STATE: ICD-10-CM

## 2024-12-05 DIAGNOSIS — Z12.31 BREAST CANCER SCREENING BY MAMMOGRAM: ICD-10-CM

## 2024-12-05 DIAGNOSIS — Z01.419 ENCOUNTER FOR WELL WOMAN EXAM WITH ROUTINE GYNECOLOGICAL EXAM: Primary | ICD-10-CM

## 2024-12-05 PROBLEM — G60.3 IDIOPATHIC PROGRESSIVE NEUROPATHY: Chronic | Status: ACTIVE | Noted: 2024-11-05

## 2024-12-05 PROBLEM — Z87.440 PERSONAL HISTORY OF URINARY (TRACT) INFECTION: Chronic | Status: ACTIVE | Noted: 2024-11-05

## 2024-12-05 PROBLEM — M13.0 POLYARTHROPATHY: Chronic | Status: ACTIVE | Noted: 2024-11-05

## 2024-12-05 PROBLEM — I10 ESSENTIAL HYPERTENSION: Chronic | Status: ACTIVE | Noted: 2024-11-05

## 2024-12-05 PROBLEM — E78.2 MIXED HYPERLIPIDEMIA: Chronic | Status: ACTIVE | Noted: 2024-11-05

## 2024-12-05 PROBLEM — E55.9 VITAMIN D DEFICIENCY: Chronic | Status: ACTIVE | Noted: 2024-11-05

## 2024-12-05 PROBLEM — K59.04 CHRONIC IDIOPATHIC CONSTIPATION: Chronic | Status: ACTIVE | Noted: 2024-11-05

## 2024-12-05 PROBLEM — E66.09 CLASS 1 OBESITY DUE TO EXCESS CALORIES WITHOUT SERIOUS COMORBIDITY IN ADULT: Chronic | Status: ACTIVE | Noted: 2024-11-05

## 2024-12-05 PROBLEM — D50.8 IRON DEFICIENCY ANEMIA SECONDARY TO INADEQUATE DIETARY IRON INTAKE: Chronic | Status: ACTIVE | Noted: 2024-11-05

## 2024-12-05 PROBLEM — K21.9 GASTROESOPHAGEAL REFLUX DISEASE WITHOUT ESOPHAGITIS: Chronic | Status: ACTIVE | Noted: 2024-11-05

## 2024-12-05 PROBLEM — E66.811 CLASS 1 OBESITY DUE TO EXCESS CALORIES WITHOUT SERIOUS COMORBIDITY IN ADULT: Chronic | Status: ACTIVE | Noted: 2024-11-05

## 2024-12-05 PROCEDURE — G0101 CA SCREEN;PELVIC/BREAST EXAM: HCPCS | Mod: S$PBB,,, | Performed by: OBSTETRICS & GYNECOLOGY

## 2024-12-05 RX ORDER — SEMAGLUTIDE 0.25 MG/.5ML
INJECTION, SOLUTION SUBCUTANEOUS
COMMUNITY

## 2024-12-05 RX ORDER — DEXTROMETHORPHAN HBR, PHENYLEPHRINE HCL, PYRILAMINE MALEATE 7.5; 5; 12.5 MG/5ML; MG/5ML; MG/5ML
SYRUP ORAL
COMMUNITY
Start: 2024-11-19

## 2025-01-07 ENCOUNTER — TELEPHONE (OUTPATIENT)
Dept: OBSTETRICS AND GYNECOLOGY | Facility: CLINIC | Age: 70
End: 2025-01-07
Payer: MEDICARE

## 2025-01-07 NOTE — TELEPHONE ENCOUNTER
----- Message from Chioma sent at 1/7/2025  3:21 PM CST -----  Contact: Silvina Cool is calling in regards to get the mammogram order now to [facility]   On [address], [place]. She is going right now to do her mammogram.  Or please call her back at [phone]  Thanks!

## 2025-01-07 NOTE — TELEPHONE ENCOUNTER
Attempted to contact patient, no answer. Left patient voice mail to inform her that [facility]

## 2025-02-17 ENCOUNTER — PATIENT MESSAGE (OUTPATIENT)
Facility: CLINIC | Age: 70
End: 2025-02-17
Payer: MEDICARE

## 2025-05-08 ENCOUNTER — OFFICE VISIT (OUTPATIENT)
Facility: CLINIC | Age: 70
End: 2025-05-08
Payer: MEDICARE

## 2025-05-08 VITALS
TEMPERATURE: 98 F | DIASTOLIC BLOOD PRESSURE: 88 MMHG | BODY MASS INDEX: 33.65 KG/M2 | HEART RATE: 54 BPM | WEIGHT: 222 LBS | SYSTOLIC BLOOD PRESSURE: 122 MMHG | RESPIRATION RATE: 16 BRPM | OXYGEN SATURATION: 98 % | HEIGHT: 68 IN

## 2025-05-08 DIAGNOSIS — I10 ESSENTIAL HYPERTENSION: Chronic | ICD-10-CM

## 2025-05-08 DIAGNOSIS — Z86.73 HISTORY OF TRANSIENT ISCHEMIC ATTACK (TIA): ICD-10-CM

## 2025-05-08 DIAGNOSIS — G60.3 IDIOPATHIC PROGRESSIVE NEUROPATHY: Chronic | ICD-10-CM

## 2025-05-08 DIAGNOSIS — Z79.899 OTHER LONG TERM (CURRENT) DRUG THERAPY: ICD-10-CM

## 2025-05-08 DIAGNOSIS — Z76.89 ENCOUNTER TO ESTABLISH CARE: Primary | ICD-10-CM

## 2025-05-08 DIAGNOSIS — M13.0 POLYARTHROPATHY: Chronic | ICD-10-CM

## 2025-05-08 DIAGNOSIS — K21.9 GASTROESOPHAGEAL REFLUX DISEASE WITHOUT ESOPHAGITIS: Chronic | ICD-10-CM

## 2025-05-08 DIAGNOSIS — E78.2 MIXED HYPERLIPIDEMIA: Chronic | ICD-10-CM

## 2025-05-08 DIAGNOSIS — N39.0 FREQUENT UTI: ICD-10-CM

## 2025-05-08 DIAGNOSIS — Z12.11 COLON CANCER SCREENING: ICD-10-CM

## 2025-05-08 LAB
ABS NRBC COUNT: 0 X 10 3/UL (ref 0–0.01)
ABSOLUTE BASOPHIL: 0.04 X 10 3/UL (ref 0–0.22)
ABSOLUTE EOSINOPHIL: 0.13 X 10 3/UL (ref 0.04–0.54)
ABSOLUTE IMMATURE GRAN: 0 X 10 3/UL (ref 0–0.04)
ABSOLUTE LYMPHOCYTE: 2.03 X 10 3/UL (ref 0.86–4.75)
ABSOLUTE MONOCYTE: 0.3 X 10 3/UL (ref 0.22–1.08)
ALBUMIN SERPL-MCNC: 4.1 G/DL (ref 3.5–5.2)
ALBUMIN/GLOB SERPL ELPH: 1.4 {RATIO} (ref 1–2.7)
ALP ISOS SERPL LEV INH-CCNC: 95 U/L (ref 35–105)
ALT (SGPT): 15 U/L (ref 0–33)
ANION GAP SERPL CALC-SCNC: 12 MMOL/L (ref 8–17)
AST SERPL-CCNC: 20 U/L (ref 0–32)
BASOPHILS NFR BLD: 0.9 % (ref 0.2–1.2)
BILIRUBIN, TOTAL: 0.54 MG/DL (ref 0–1.2)
BUN/CREAT SERPL: 16.9 (ref 6–20)
CALCIUM SERPL-MCNC: 9.5 MG/DL (ref 8.6–10.2)
CARBON DIOXIDE, CO2: 27 MMOL/L (ref 22–29)
CHLORIDE: 106 MMOL/L (ref 98–107)
CHOLEST SERPL-MCNC: 113 MG/DL (ref 0–150)
CHOLEST SERPL-MSCNC: 185 MG/DL (ref 100–200)
CREAT SERPL-MCNC: 0.86 MG/DL (ref 0.5–0.9)
EOSINOPHIL NFR BLD: 2.8 % (ref 0.7–7)
ESTIMATED AVERAGE GLUCOSE: 118 MG/DL (ref 70–126)
GFR ESTIMATION: 72.63 ML/MIN/1.73M2
GLOBULIN: 3 G/DL (ref 1.5–4.5)
GLUCOSE: 80 MG/DL (ref 82–115)
HBA1C MFR BLD: 5.8 % (ref 4–5.6)
HCT VFR BLD AUTO: 34.7 % (ref 37–47)
HDLC SERPL-MCNC: 53 MG/DL
HGB BLD-MCNC: 10.9 G/DL (ref 12–16)
IMMATURE GRANULOCYTES: 0 % (ref 0–0.5)
LDL/HDL RATIO: 2.1 (ref 1–3)
LDLC SERPL CALC-MCNC: 109.4 MG/DL (ref 0–100)
LYMPHOCYTES NFR BLD: 44.2 % (ref 19.3–53.1)
MCH RBC QN AUTO: 30.1 PG (ref 27–32)
MCHC RBC AUTO-ENTMCNC: 31.4 G/DL (ref 32–36)
MCV RBC AUTO: 95.9 FL (ref 82–100)
MONOCYTES NFR BLD: 6.5 % (ref 4.7–12.5)
NEUTROPHILS # BLD AUTO: 2.09 X 10 3/UL (ref 2.15–7.56)
NEUTROPHILS NFR BLD: 45.6 % (ref 34–71.1)
NUCLEATED RED BLOOD CELLS: 0 /100 WBC (ref 0–0.2)
PLATELET # BLD AUTO: 154 X 10 3/UL (ref 135–400)
POTASSIUM: 3.9 MMOL/L (ref 3.5–5.1)
PROT SNV-MCNC: 7.1 G/DL (ref 6.4–8.3)
RBC # BLD AUTO: 3.62 X 10 6/UL (ref 4.2–5.4)
RDW-SD: 50.4 FL (ref 37–54)
SODIUM: 145 MMOL/L (ref 136–145)
T4, FREE: 1.09 NG/DL (ref 0.93–1.7)
TSH SERPL DL<=0.005 MIU/L-ACNC: 1.67 UIU/ML (ref 0.27–4.2)
UREA NITROGEN (BUN): 14.5 MG/DL (ref 8–23)
WBC # BLD: 4.59 X 10 3/UL (ref 4.3–10.8)

## 2025-05-08 PROCEDURE — 99205 OFFICE O/P NEW HI 60 MIN: CPT | Mod: S$GLB,,, | Performed by: INTERNAL MEDICINE

## 2025-05-08 RX ORDER — MELOXICAM 15 MG/1
15 TABLET ORAL DAILY PRN
Qty: 30 TABLET | Refills: 5 | Status: SHIPPED | OUTPATIENT
Start: 2025-05-08

## 2025-05-08 RX ORDER — ATORVASTATIN CALCIUM 40 MG/1
40 TABLET, FILM COATED ORAL DAILY
Qty: 90 TABLET | Refills: 1 | Status: SHIPPED | OUTPATIENT
Start: 2025-05-08

## 2025-05-08 RX ORDER — PANTOPRAZOLE SODIUM 40 MG/1
40 TABLET, DELAYED RELEASE ORAL DAILY
Qty: 90 TABLET | Refills: 1 | Status: SHIPPED | OUTPATIENT
Start: 2025-05-08

## 2025-05-08 RX ORDER — NITROFURANTOIN MACROCRYSTALS 50 MG/1
50 CAPSULE ORAL DAILY
Qty: 90 CAPSULE | Refills: 1 | Status: SHIPPED | OUTPATIENT
Start: 2025-05-08

## 2025-05-08 RX ORDER — LISINOPRIL 10 MG/1
10 TABLET ORAL DAILY
Qty: 90 TABLET | Refills: 1 | Status: SHIPPED | OUTPATIENT
Start: 2025-05-08

## 2025-05-08 RX ORDER — GABAPENTIN 300 MG/1
300 CAPSULE ORAL 3 TIMES DAILY
Qty: 90 CAPSULE | Refills: 2 | Status: SHIPPED | OUTPATIENT
Start: 2025-05-08

## 2025-05-08 RX ORDER — ASPIRIN 81 MG/1
81 TABLET ORAL
COMMUNITY

## 2025-05-08 NOTE — PROGRESS NOTES
Subjective:       Patient ID: Silvina Morgan is a 70 y.o. female.    Chief Complaint: Establish Care (Patient states that her last PCP was ashley strange. )    HPI    70 y.o. female here to establish care.       Health Maintenance Topics with due status: Not Due       Topic Last Completion Date    TETANUS VACCINE 2020    Influenza Vaccine 2023    DEXA Scan 2024    Mammogram 2025    High Dose Statin 2025       Health Maintenance Due   Topic Date Due    Lipid Panel  Never done    Hemoglobin A1c (Diabetic Prevention Screening)  Never done    Colorectal Cancer Screening  Never done    Shingles Vaccine (1 of 2) Never done    Pneumococcal Vaccines (Age 50+) (1 of 1 - PCV) Never done    RSV Vaccine (Age 60+ and Pregnant patients) (1 - Risk 60-74 years 1-dose series) Never done    COVID-19 Vaccine (2024- season) 2024         Medical Problems:    Has been out of meds since seeing pcp in November.     HTN: lisinopril 10mg  Hld: atorva 40mg  Gerd: pantoprazole 40mg  H/o anx and dep: cymbalta. Did have withdrawal symptoms when stopped so does not want to resume.  Frequent uti: macrobid daily  Pain: meloxicam 15mg daily  Neuropathy: gabapentin 300mg tid.     Problem List[1]    Medications Ordered Prior to Encounter[2]        Past Medical History:   Diagnosis Date    Arthritis     Colon polyp     GERD (gastroesophageal reflux disease)     Glaucoma     HTN (hypertension)     Mixed hyperlipidemia     Pancytopenia     Pleurisy     Post-menopause atrophic vaginitis     HRT    Recurrent UTI     Spinal stenosis     Urinary tract infection      Past Surgical History:   Procedure Laterality Date    APPENDECTOMY       SECTION      colonoscopy with polys      TOTAL ABDOMINAL HYSTERECTOMY W/ BILATERAL SALPINGOOPHORECTOMY      2nd to fibroids    TOTAL KNEE ARTHROPLASTY Right     knee replacement    TOTAL KNEE ARTHROPLASTY Left      Family History   Problem Relation Name Age of  "Onset    Breast cancer Mother  56    Hypertension Mother      Diabetes Mother      No Known Problems Father      No Known Problems Sister      No Known Problems Brother      No Known Problems Maternal Grandmother      No Known Problems Maternal Grandfather      No Known Problems Paternal Grandmother      No Known Problems Paternal Grandfather       Social History[3]  Review of patient's allergies indicates:   Allergen Reactions    Pcn [penicillins]      Current Medications[4]        Review of Systems   Constitutional:  Negative for fever.   Respiratory:  Negative for shortness of breath.    Cardiovascular:  Negative for chest pain.   Gastrointestinal:  Positive for constipation. Negative for vomiting.   Genitourinary:  Positive for frequency. Negative for dysuria.   Musculoskeletal:  Positive for arthralgias and back pain.   Neurological:  Positive for numbness. Negative for syncope.       Objective:        Vitals:    05/08/25 1036   BP: 122/88   BP Location: Left arm   Patient Position: Sitting   Pulse: (!) 54   Resp: 16   Temp: 97.9 °F (36.6 °C)   TempSrc: Oral   SpO2: 98%   Weight: 100.7 kg (222 lb)   Height: 5' 8" (1.727 m)       Body mass index is 33.75 kg/m².    Physical Exam  Constitutional:       General: She is not in acute distress.     Appearance: She is well-developed. She is not diaphoretic.   HENT:      Head: Normocephalic and atraumatic.      Right Ear: External ear normal.      Left Ear: External ear normal.   Eyes:      Conjunctiva/sclera: Conjunctivae normal.   Cardiovascular:      Rate and Rhythm: Normal rate and regular rhythm.   Pulmonary:      Effort: Pulmonary effort is normal.      Breath sounds: Normal breath sounds.   Abdominal:      General: There is no distension.      Palpations: Abdomen is soft.   Musculoskeletal:      Right lower leg: No edema.      Left lower leg: No edema.   Skin:     General: Skin is warm and dry.      Nails: There is no clubbing.   Neurological:      Mental Status: " She is alert.   Psychiatric:         Behavior: Behavior normal.         Judgment: Judgment normal.         Assessment:     1. Encounter to establish care    2. Gastroesophageal reflux disease without esophagitis    3. Essential hypertension    4. Mixed hyperlipidemia    5. Frequent UTI    6. Idiopathic progressive neuropathy    7. History of transient ischemic attack (TIA)    8. Polyarthropathy    9. Other long term (current) drug therapy    10. Colon cancer screening           Plan:         1. Encounter to establish care  - reviewed healthcare maintenance and pt's chronic medical problems.   - previous pcp records reviewed via care everywhere as well as specialists records  - labs - fasting today  - immunizations reviewed  - CRC screen - due - defer to after scheduled lumbar surgery  - mammo utd  - CBC Auto Differential  - Comprehensive Metabolic Panel  - Hemoglobin A1C  - Lipid Panel  - TSH  - T4, Free    2. Gastroesophageal reflux disease without esophagitis    - pantoprazole (PROTONIX) 40 MG tablet; Take 1 tablet (40 mg total) by mouth once daily.  Dispense: 90 tablet; Refill: 1  - CBC Auto Differential    3. Essential hypertension  - controlled  - lisinopriL 10 MG tablet; Take 1 tablet (10 mg total) by mouth once daily.  Dispense: 90 tablet; Refill: 1  - Comprehensive Metabolic Panel    4. Mixed hyperlipidemia    - atorvastatin (LIPITOR) 40 MG tablet; Take 1 tablet (40 mg total) by mouth once daily.  Dispense: 90 tablet; Refill: 1  - Lipid Panel    5. Colon cancer screening  - defer    6. Gastroesophageal reflux disease, unspecified whether esophagitis present  - pantoprazole    7. Frequent UTI    - nitrofurantoin (MACRODANTIN) 50 MG capsule; Take 1 capsule (50 mg total) by mouth once daily.  Dispense: 90 capsule; Refill: 1    8. Idiopathic progressive neuropathy  - previous w/u unremarkable. Continue gabapentin for symptoms.   - gabapentin (NEURONTIN) 300 MG capsule; Take 1 capsule (300 mg total) by mouth 3  (three) times daily.  Dispense: 90 capsule; Refill: 2    9. History of transient ischemic attack (TIA)  - recently at [facility]. Records reviewed. Statin and aspirin. Requests neuro ref to Dr. Godoy who she has seen previously.   - Ambulatory referral/consult to Neurology; Future    10. Polyarthropathy  - recent renal fxn wnl.   - meloxicam (MOBIC) 15 MG tablet; Take 1 tablet (15 mg total) by mouth daily as needed for Pain.  Dispense: 30 tablet; Refill: 5    11. Other long term (current) drug therapy    - CBC Auto Differential  - Comprehensive Metabolic Panel  - Hemoglobin A1C  - Lipid Panel  - TSH  - T4, Free    Independent interpretation of tests completed by another healthcare provider.           Unless there are intervening problems, Follow up in about 3 months (around 8/8/2025), or if symptoms worsen or fail to improve, for follow up.      Patient note was created using MModal Dictation.  Any errors in syntax or even information may not have been identified and edited on initial review prior to signing this note.    I spent a total of 60 minutes on the day of the visit.        This includes face to face time and non-face to face time preparing to see the patient (eg, review of tests), obtaining and/or reviewing separately obtained history, documenting clinical information in the electronic or other health record, independently interpreting results and communicating results to the patient/family/caregiver, or care coordinator.         [1]   Patient Active Problem List  Diagnosis    Mixed hyperlipidemia    Personal history of urinary (tract) infection    Polyarthropathy    Vitamin D deficiency    Iron deficiency anemia secondary to inadequate dietary iron intake    Idiopathic progressive neuropathy    Gastroesophageal reflux disease without esophagitis    Essential hypertension    Chronic idiopathic constipation    Class 1 obesity due to excess calories without serious comorbidity in adult    Gastroesophageal reflux  disease    Frequent UTI    History of transient ischemic attack (TIA)   [2]   Current Outpatient Medications on File Prior to Visit   Medication Sig Dispense Refill    aspirin (ECOTRIN) 81 MG EC tablet Take 81 mg by mouth.      dorzolamide-timolol 2-0.5% (COSOPT) 22.3-6.8 mg/mL ophthalmic solution Place 1 drop into both eyes 2 (two) times daily.      [DISCONTINUED] atorvastatin (LIPITOR) 40 MG tablet       [DISCONTINUED] gabapentin (NEURONTIN) 300 MG capsule Take 300 mg by mouth 3 (three) times daily.      [DISCONTINUED] lisinopriL 10 MG tablet Take 1 tablet by mouth once daily.      [DISCONTINUED] meloxicam (MOBIC) 15 MG tablet Take 15 mg by mouth daily as needed.      [DISCONTINUED] nitrofurantoin (MACRODANTIN) 50 MG capsule Take 50 mg by mouth.      [DISCONTINUED] pantoprazole (PROTONIX) 40 MG tablet Take 40 mg by mouth once daily.      calcium-vitamin D3 (OS-MAULIK 500 + D3) 500 mg-5 mcg (200 unit) per tablet Take 1 tablet by mouth 2 (two) times daily with meals. (Patient not taking: Reported on 5/8/2025)      latanoprost 0.005 % ophthalmic solution  (Patient not taking: Reported on 5/8/2025)      timolol maleate 0.5% (TIMOPTIC) 0.5 % Drop Place 1 drop into both eyes 2 (two) times daily. (Patient not taking: Reported on 5/8/2025)      [DISCONTINUED] amLODIPine (NORVASC) 5 MG tablet Take 1 tablet by mouth every morning. (Patient not taking: Reported on 5/8/2025)      [DISCONTINUED] cloNIDine (CATAPRES) 0.1 MG tablet TAKE 1 TABLET BY MOUTH EVERY 8 HOURS AS NEEDED FOR SYSTOLIC BLOOD PRESSURE GREATER THAN 180 OR DIASTOLIC BLOOD PRESSURE GREATER THAN 100 (Patient not taking: Reported on 5/8/2025)      [DISCONTINUED] DULoxetine (CYMBALTA) 30 MG capsule TAKE 1 CAPSULE BY MOUTH ONCE DAILY WITH MEALS INCREASE AFTER 7 DAYS TO 2 DAILY (Patient not taking: Reported on 5/8/2025)      [DISCONTINUED] ergocalciferol (ERGOCALCIFEROL) 50,000 unit Cap Take 1 capsule by mouth every 7 days. (Patient not taking: Reported on 5/8/2025)       [DISCONTINUED] esomeprazole (NEXIUM) 40 MG capsule Take 1 capsule by mouth every morning. (Patient not taking: Reported on 5/8/2025)      [DISCONTINUED] ferrous gluconate (FERGON) 324 MG tablet Take 1 tablet by mouth every other day. (Patient not taking: Reported on 5/8/2025)      [DISCONTINUED] linaCLOtide (LINZESS) 145 mcg Cap capsule TAKE ONE CAPSULE BY MOUTH ONCE DAILY ON AN EMPTY STOMACH, 30 MINUTES BEFORE A MEAL (Patient not taking: Reported on 5/8/2025)      [DISCONTINUED] POLYTUSSIN DM,PYRILAMINE, 12.5-5-7.5 mg/5 mL Liqd TAKE 10 ML BY MOUTH EVERY 6 HOURS AS NEEDED FOR COUGH (Patient not taking: Reported on 5/8/2025)      [DISCONTINUED] semaglutide, weight loss, (WEGOVY) 0.25 mg/0.5 mL PnIj 1 pen injector subcutaneously once a week . Call office for next dose prescription when you are on the 4th dose of this prescription.  USE Rx DISCOUNT CARD: $1456.21, BIN:[id number], PCN:LISA, Group:[facility], ID:[id number]       No current facility-administered medications on file prior to visit.   [3]   Social History  Socioeconomic History    Marital status:    Tobacco Use    Smoking status: Never    Smokeless tobacco: Never   Substance and Sexual Activity    Alcohol use: Not Currently    Drug use: Never    Sexual activity: Not Currently     Social Drivers of Health     Financial Resource Strain: Low Risk  (4/29/2025)    Overall Financial Resource Strain (CARDIA)     Difficulty of Paying Living Expenses: Not hard at all   Food Insecurity: No Food Insecurity (4/29/2025)    Hunger Vital Sign     Worried About Running Out of Food in the Last Year: Never true     Ran Out of Food in the Last Year: Never true   Transportation Needs: No Transportation Needs (4/29/2025)    PRAPARE - Transportation     Lack of Transportation (Medical): No     Lack of Transportation (Non-Medical): No   Physical Activity: Inactive (4/29/2025)    Exercise Vital Sign     Days of Exercise per Week: 0 days     Minutes of Exercise per Session: 0 min    Stress: No Stress Concern Present (4/29/2025)    [employer] [place] [employer] - Occupational Stress Questionnaire     Feeling of Stress : Not at all   Housing Stability: Low Risk  (4/29/2025)    Housing Stability Vital Sign     Unable to Pay for Housing in the Last Year: No     Number of Times Moved in the Last Year: 0     Homeless in the Last Year: No   [4]   Current Outpatient Medications:     aspirin (ECOTRIN) 81 MG EC tablet, Take 81 mg by mouth., Disp: , Rfl:     dorzolamide-timolol 2-0.5% (COSOPT) 22.3-6.8 mg/mL ophthalmic solution, Place 1 drop into both eyes 2 (two) times daily., Disp: , Rfl:     atorvastatin (LIPITOR) 40 MG tablet, Take 1 tablet (40 mg total) by mouth once daily., Disp: 90 tablet, Rfl: 1    calcium-vitamin D3 (OS-MAULIK 500 + D3) 500 mg-5 mcg (200 unit) per tablet, Take 1 tablet by mouth 2 (two) times daily with meals. (Patient not taking: Reported on 5/8/2025), Disp: , Rfl:     gabapentin (NEURONTIN) 300 MG capsule, Take 1 capsule (300 mg total) by mouth 3 (three) times daily., Disp: 90 capsule, Rfl: 2    latanoprost 0.005 % ophthalmic solution, , Disp: , Rfl:     lisinopriL 10 MG tablet, Take 1 tablet (10 mg total) by mouth once daily., Disp: 90 tablet, Rfl: 1    meloxicam (MOBIC) 15 MG tablet, Take 1 tablet (15 mg total) by mouth daily as needed for Pain., Disp: 30 tablet, Rfl: 5    nitrofurantoin (MACRODANTIN) 50 MG capsule, Take 1 capsule (50 mg total) by mouth once daily., Disp: 90 capsule, Rfl: 1    pantoprazole (PROTONIX) 40 MG tablet, Take 1 tablet (40 mg total) by mouth once daily., Disp: 90 tablet, Rfl: 1    timolol maleate 0.5% (TIMOPTIC) 0.5 % Drop, Place 1 drop into both eyes 2 (two) times daily. (Patient not taking: Reported on 5/8/2025), Disp: , Rfl:

## 2025-05-14 ENCOUNTER — RESULTS FOLLOW-UP (OUTPATIENT)
Facility: CLINIC | Age: 70
End: 2025-05-14
Payer: MEDICARE

## 2025-05-14 DIAGNOSIS — R73.03 PREDIABETES: Primary | ICD-10-CM

## 2025-05-14 NOTE — PROGRESS NOTES
Results released to patient portal.      Your results have been reviewed.  Blood count at your baseline.  The A1c is the 3 month average of your blood sugar. It shows that you have pre-diabetes. It is important to eat a healthy diet and exercise. We will repeat this lab at your next follow up apt. A good resource for additional information is the American Diabetes Association's website: https://www.diabetes.org/diabetes-risk/prediabetes  The LDL cholesterol (bad) is a little high despite the atorvastatin- work on low cholesterol diet and continue current med, we will monitor over time.  Thyroid labs, kidney function, liver enzymes all in good range.  Please call if you have any questions or concerns.    Sincerely,   Dr. Martinez

## 2025-06-19 ENCOUNTER — TELEPHONE (OUTPATIENT)
Facility: CLINIC | Age: 70
End: 2025-06-19
Payer: MEDICARE

## 2025-06-19 NOTE — TELEPHONE ENCOUNTER
Informed patient that I have no recienced any peper work from Dr. Desai's office for a SX celarerance but I will call there office and get it. She VU

## 2025-06-19 NOTE — TELEPHONE ENCOUNTER
Copied from CRM #[id number]. Topic: General Inquiry - Patient Advice  >> Jun 19, 2025  1:58 PM Kristi wrote:   Patient is calling in regards to forms for surgery clearance please call her back at [phone]

## 2025-06-19 NOTE — TELEPHONE ENCOUNTER
Can you see if Sheri has any openings to do a sx clearance for this patient ?   Thanks !     Copied from CRM #[id number]. Topic: General Inquiry - Patient Advice  >> Jun 19, 2025 12:09 PM Michelle wrote:  ..Type:  Patient Requesting Call    Who Called:ANN Morgan  Would the patient rather a call back or a response via [facility]? Call  Best Call Back Number:.[phone] (home) [phone] (work)  Additional Information: Patient called to discus getting clearance for surgery for ortho

## 2025-06-20 ENCOUNTER — TELEPHONE (OUTPATIENT)
Facility: CLINIC | Age: 70
End: 2025-06-20
Payer: MEDICARE

## 2025-07-29 ENCOUNTER — NURSE TRIAGE (OUTPATIENT)
Dept: ADMINISTRATIVE | Facility: CLINIC | Age: 70
End: 2025-07-29
Payer: MEDICARE

## 2025-07-29 NOTE — TELEPHONE ENCOUNTER
Patient's call transferred as a Priority Call to [facility]. Patient states she is s/p Orthopedic/Back Surgery on 6/23/25 at the [facility] and her Surgeon is Dr. Travon Gee. Patient states c/o Fever, Chest Aches, Weakness, Lethargy, Rib Pain, Upset Stomach and Chills.     Patient advised to Go to ED Now for evaluation and to have another adult drive her to ED facility. Patient also advised to contact the [facility] for any worsening symptoms.     Reason for Disposition   Nursing judgment    Additional Information   Negative: Sounds like a life-threatening emergency to the triager   Negative: Information only call about a Well Adult (no illness or injury)   Negative: Caller can't be reached by phone    Protocols used: No Protocol [id number]

## 2025-07-30 ENCOUNTER — PATIENT MESSAGE (OUTPATIENT)
Facility: CLINIC | Age: 70
End: 2025-07-30
Payer: MEDICARE

## 2025-07-31 ENCOUNTER — TELEPHONE (OUTPATIENT)
Facility: CLINIC | Age: 70
End: 2025-07-31
Payer: MEDICARE

## 2025-07-31 NOTE — TELEPHONE ENCOUNTER
Patient states that she went to the ER 07/29  And the new blood test that the ER did is showing that she has a blood clot and now she is freaking out and crying and she doesn't know what to do and she wants Dr. Martinez to look at it and make sure that every thing is okay.

## 2025-07-31 NOTE — TELEPHONE ENCOUNTER
Informed patient of the above message and I told her that Patricia our new NP has an appointment at 8am on Monday to see her.   I also told her that if she has any pain, swelling, tenderness, or redness on her lower extremities then to please let us know and to also go to the ER. She VU

## 2025-07-31 NOTE — TELEPHONE ENCOUNTER
They did a CTA scan which showed no evidence of pulmonary embolus (no blood clot in the lungs). Recommend hospital f/u apt.

## 2025-08-04 ENCOUNTER — OFFICE VISIT (OUTPATIENT)
Facility: CLINIC | Age: 70
End: 2025-08-04
Payer: MEDICARE

## 2025-08-04 VITALS
RESPIRATION RATE: 18 BRPM | SYSTOLIC BLOOD PRESSURE: 128 MMHG | HEART RATE: 64 BPM | DIASTOLIC BLOOD PRESSURE: 80 MMHG | HEIGHT: 68 IN | BODY MASS INDEX: 32.01 KG/M2 | OXYGEN SATURATION: 99 % | WEIGHT: 211.19 LBS

## 2025-08-04 DIAGNOSIS — Z79.899 OTHER LONG TERM (CURRENT) DRUG THERAPY: ICD-10-CM

## 2025-08-04 DIAGNOSIS — R79.89 ELEVATED D-DIMER: ICD-10-CM

## 2025-08-04 DIAGNOSIS — Z09 HOSPITAL DISCHARGE FOLLOW-UP: Primary | ICD-10-CM

## 2025-08-04 DIAGNOSIS — R13.10 DYSPHAGIA, UNSPECIFIED TYPE: ICD-10-CM

## 2025-08-04 PROCEDURE — 99215 OFFICE O/P EST HI 40 MIN: CPT | Mod: S$GLB,,,

## 2025-08-04 RX ORDER — HYDROCODONE BITARTRATE AND ACETAMINOPHEN 7.5; 325 MG/1; MG/1
TABLET ORAL
COMMUNITY
Start: 2025-07-28

## 2025-08-04 NOTE — PROGRESS NOTES
"Subjective:      Patient ID: Silvina Morgan is a 70 y.o. female.    Chief Complaint: Follow-up (Patient pt for follow up. )    HPI    70 y.o. female presents for hospital follow up:    Overall feeling better. No fever, chest pain, or rib pain with respirations.    Went to [facility] this AM: lower extremity venous U/S negative-requested results.     Abnormally dark green colored stools over the weekend, but have since normalized after taking probiotics.     Given 1 unit of blood in hospital due to decreased h/h. Will repeat CBC today.     Dysphagia- had seen doctor several years back, put on protonix. Did improve initially, but now problem has come back. Would like referral to ENT.     Review of Systems   Constitutional:  Negative for diaphoresis and fever.   HENT:  Positive for trouble swallowing.         Pt states " it feels like something is stuck in my throat when I try to swallow."   Respiratory:  Negative for cough and shortness of breath.    Cardiovascular:  Negative for chest pain and leg swelling.   Gastrointestinal:  Negative for abdominal pain and blood in stool.   Genitourinary:  Negative for difficulty urinating.   Musculoskeletal:  Negative for gait problem.   Integumentary:  Negative for pallor.   Neurological:  Negative for syncope and weakness.       Medication List with Changes/Refills   Current Medications    ASPIRIN (ECOTRIN) 81 MG EC TABLET    Take 81 mg by mouth.    ATORVASTATIN (LIPITOR) 40 MG TABLET    Take 1 tablet (40 mg total) by mouth once daily.    CALCIUM-VITAMIN D3 (OS-MAULIK 500 + D3) 500 MG-5 MCG (200 UNIT) PER TABLET    Take 1 tablet by mouth 2 (two) times daily with meals.    DORZOLAMIDE-TIMOLOL 2-0.5% (COSOPT) 22.3-6.8 MG/ML OPHTHALMIC SOLUTION    Place 1 drop into both eyes 2 (two) times daily.    GABAPENTIN (NEURONTIN) 300 MG CAPSULE    Take 1 capsule (300 mg total) by mouth 3 (three) times daily.    HYDROCODONE-ACETAMINOPHEN (NORCO) 7.5-325 MG PER TABLET    " "SMARTSI Tablet(s) By Mouth Every 2-4 Hours PRN    LATANOPROST 0.005 % OPHTHALMIC SOLUTION        LISINOPRIL 10 MG TABLET    Take 1 tablet (10 mg total) by mouth once daily.    MELOXICAM (MOBIC) 15 MG TABLET    Take 1 tablet (15 mg total) by mouth daily as needed for Pain.    NITROFURANTOIN (MACRODANTIN) 50 MG CAPSULE    Take 1 capsule (50 mg total) by mouth once daily.    PANTOPRAZOLE (PROTONIX) 40 MG TABLET    Take 1 tablet (40 mg total) by mouth once daily.    TIMOLOL MALEATE 0.5% (TIMOPTIC) 0.5 % DROP    Place 1 drop into both eyes 2 (two) times daily.        Objective:     Vitals:    25 0818   BP: 128/80   Pulse: 64   Resp: 18   SpO2: 99%   Weight: 95.8 kg (211 lb 3.2 oz)   Height: 5' 8" (1.727 m)        Physical Exam  Vitals reviewed.   Constitutional:       Appearance: Normal appearance.   HENT:      Head: Atraumatic.   Eyes:      Pupils: Pupils are equal, round, and reactive to light.   Cardiovascular:      Rate and Rhythm: Normal rate and regular rhythm.      Pulses: Normal pulses.      Heart sounds: Normal heart sounds.   Pulmonary:      Effort: Pulmonary effort is normal.      Breath sounds: Normal breath sounds.   Abdominal:      General: Abdomen is flat.   Musculoskeletal:         General: Normal range of motion.      Cervical back: Normal range of motion.   Skin:     General: Skin is warm and dry.   Neurological:      General: No focal deficit present.      Mental Status: She is alert.   Psychiatric:         Mood and Affect: Mood normal.         Behavior: Behavior normal.         Thought Content: Thought content normal.         Judgment: Judgment normal.       Body mass index is 32.11 kg/m².     Assessment & Plan:     1. Hospital discharge follow-up  Feeling better overall since hospital discharge on 2025  No new complaints  - CBC Auto Differential  - D-Dimer, Quantitative    2. Elevated d-dimer  Spinal fusion surgery on 2025 with Dr. Mike Desai  CTA- negative  Lower extremity " venous U/S- negative  Chest x-ray negative  Hospital requested repeated d-dimer in clinic at follow up appt.   Orders printed for pt. Labs to be drawn at path lab due to incorrect supplies in clinic.   - D-Dimer, Quantitative    3. Dysphagia, unspecified type  Pt requests referral. Protonix helped problem initially, but has since returned.  - Ambulatory referral/consult to ENT; Future    4. Other long term (current) drug therapy  Orders printed for pt. Labs to be drawn at path lab due to incorrect supplies in clinic.   - CBC Auto Differential  - D-Dimer, Quantitative       Keep scheduled appt with Dr. Martinez on 08/12/2025.      -Patient instructed that our office calls back on all labs and imaging within 1 week of receiving the results. Patient instructed to reach out to our office if they have not heard from us so that we can request the results from the lab/imaging center    I spent a total of 40 minutes on the day of the visit.This includes face to face time and non-face to face time preparing to see the patient (eg, review of tests), obtaining and/or reviewing separately obtained history, documenting clinical information in the electronic or other health record, independently interpreting results and communicating results to the patient/family/caregiver, or care coordinator.     Independent interpretation of tests completed by another healthcare provider.            KERA Thorpe, FNP-C

## 2025-08-05 ENCOUNTER — TELEPHONE (OUTPATIENT)
Dept: HEMATOLOGY/ONCOLOGY | Facility: CLINIC | Age: 70
End: 2025-08-05
Payer: MEDICARE

## 2025-08-05 LAB
CRITICAL ASSAY: NORMAL
D DIMER PPP IA.FEU-MCNC: 5213 NG/ML FEU
DATE/TIME: NORMAL
LAB PERSONNEL: NORMAL
PERSON NOTIFIED/TITLE: NORMAL

## 2025-08-05 NOTE — TELEPHONE ENCOUNTER
Spoke with the patient. Appointment date, time and location provided. All questions answered. TTRN

## 2025-08-12 ENCOUNTER — OFFICE VISIT (OUTPATIENT)
Facility: CLINIC | Age: 70
End: 2025-08-12
Payer: MEDICARE

## 2025-08-12 VITALS
WEIGHT: 212 LBS | SYSTOLIC BLOOD PRESSURE: 118 MMHG | OXYGEN SATURATION: 98 % | RESPIRATION RATE: 16 BRPM | BODY MASS INDEX: 32.13 KG/M2 | TEMPERATURE: 98 F | HEART RATE: 76 BPM | HEIGHT: 68 IN | DIASTOLIC BLOOD PRESSURE: 80 MMHG

## 2025-08-12 DIAGNOSIS — E78.2 MIXED HYPERLIPIDEMIA: Primary | Chronic | ICD-10-CM

## 2025-08-12 DIAGNOSIS — G60.3 IDIOPATHIC PROGRESSIVE NEUROPATHY: Chronic | ICD-10-CM

## 2025-08-12 DIAGNOSIS — R73.03 PREDIABETES: ICD-10-CM

## 2025-08-12 DIAGNOSIS — N39.0 FREQUENT UTI: ICD-10-CM

## 2025-08-12 DIAGNOSIS — I10 ESSENTIAL HYPERTENSION: Chronic | ICD-10-CM

## 2025-08-12 LAB — HBA1C MFR BLD: 5.3 %

## 2025-08-12 PROCEDURE — 83036 HEMOGLOBIN GLYCOSYLATED A1C: CPT | Mod: QW,,, | Performed by: INTERNAL MEDICINE

## 2025-08-12 PROCEDURE — 99214 OFFICE O/P EST MOD 30 MIN: CPT | Mod: S$GLB,,, | Performed by: INTERNAL MEDICINE

## 2025-08-12 RX ORDER — GABAPENTIN 300 MG/1
300 CAPSULE ORAL 3 TIMES DAILY
Qty: 90 CAPSULE | Refills: 2 | Status: SHIPPED | OUTPATIENT
Start: 2025-08-12

## 2025-08-12 RX ORDER — NITROFURANTOIN MACROCRYSTALS 50 MG/1
50 CAPSULE ORAL DAILY
Qty: 90 CAPSULE | Refills: 1 | Status: SHIPPED | OUTPATIENT
Start: 2025-08-12

## 2025-08-18 ENCOUNTER — OFFICE VISIT (OUTPATIENT)
Dept: HEMATOLOGY/ONCOLOGY | Facility: CLINIC | Age: 70
End: 2025-08-18
Payer: MEDICARE

## 2025-08-18 VITALS
HEIGHT: 68 IN | DIASTOLIC BLOOD PRESSURE: 78 MMHG | OXYGEN SATURATION: 95 % | SYSTOLIC BLOOD PRESSURE: 134 MMHG | BODY MASS INDEX: 32.04 KG/M2 | HEART RATE: 63 BPM | RESPIRATION RATE: 16 BRPM | WEIGHT: 211.38 LBS

## 2025-08-18 DIAGNOSIS — E78.2 MIXED HYPERLIPIDEMIA: Chronic | ICD-10-CM

## 2025-08-18 DIAGNOSIS — I10 HYPERTENSION, UNSPECIFIED TYPE: ICD-10-CM

## 2025-08-18 DIAGNOSIS — D64.9 ANEMIA, UNSPECIFIED TYPE: Primary | ICD-10-CM

## 2025-08-18 DIAGNOSIS — D63.8 ANEMIA OF CHRONIC DISEASE: ICD-10-CM

## 2025-08-18 DIAGNOSIS — E78.5 HYPERLIPIDEMIA, UNSPECIFIED HYPERLIPIDEMIA TYPE: ICD-10-CM

## 2025-08-18 DIAGNOSIS — G60.3 IDIOPATHIC PROGRESSIVE NEUROPATHY: Chronic | ICD-10-CM

## 2025-08-18 LAB
ABS NRBC COUNT: 0 X 10 3/UL (ref 0–0.01)
ABSOLUTE BASOPHIL: 0.06 X 10 3/UL (ref 0–0.22)
ABSOLUTE EOSINOPHIL: 0.17 X 10 3/UL (ref 0.04–0.54)
ABSOLUTE LYMPHOCYTE: 2.26 X 10 3/UL (ref 0.86–4.75)
ABSOLUTE MONOCYTE: 0.22 X 10 3/UL (ref 0.22–1.08)
ABSOLUTE RETIC: 0.06 X 10 6/UL (ref 0.02–0.08)
B19V IGG+IGM PNL SER: 19.7 % (ref 3–15.9)
BANDS: ABNORMAL
BASOPHILS NFR BLD: 1 % (ref 0–1)
BLAST CELL: ABNORMAL
CORTIS SERPL-MCNC: ABNORMAL UG/DL
EOSINOPHIL NFR BLD: 3 % (ref 1–7)
FERRITIN: 170 NG/ML (ref 20–288)
HCT VFR BLD AUTO: 31.3 % (ref 37–47)
HGB BLD-MCNC: 9.5 G/DL (ref 12–16)
IPF: 7.4 % (ref 1.1–6.1)
IRON BINDING CAPACITY: 256 UG/DL (ref 262–472)
IRON SERPL-MCNC: 53 UG/DL (ref 37–145)
LYMPHOCYTES NFR BLD: 39 % (ref 19–53)
MCH RBC QN AUTO: 28.4 PG (ref 27–32)
MCHC RBC AUTO-ENTMCNC: 30.4 G/DL (ref 32–36)
MCV RBC AUTO: 93.4 FL (ref 82–100)
METAMYELOCYTES # BLD MANUAL: ABNORMAL 10*3/UL
MONOCYTES NFR BLD: 4 % (ref 5–13)
NEUTROPHILS # BLD AUTO: 2.82 X 10 3/UL (ref 2.32–6.7)
NUCLEATED RED BLOOD CELLS: 0 /100 WBC (ref 0–0.2)
OTHER: ABNORMAL
PATHOLOGIST REVIEW: ABNORMAL
PLATELET # BLD AUTO: 197 X 10 3/UL (ref 135–400)
PROMYELOCYTES: ABNORMAL
RBC # BLD AUTO: 3.35 X 10 6/UL (ref 4.2–5.4)
RBC & PLT MORPHOLOGY: ABNORMAL
RDW-SD: 53.2 FL (ref 37–54)
REACTIVE LYMPHOCYTES: 2 %
RET-HE: 32.5 PG (ref 29.8–39)
RETICULOCYTE COUNT: 1.7 % (ref 0.5–1.7)
SEGMENTERS: 51 % (ref 34–71)
UIBC SERPL-MCNC: 203 UG/DL (ref 112–306)
WBC # BLD: 5.52 X 10 3/UL (ref 4.3–10.8)

## 2025-08-18 PROCEDURE — 99205 OFFICE O/P NEW HI 60 MIN: CPT | Mod: S$PBB,,, | Performed by: INTERNAL MEDICINE

## 2025-08-18 PROCEDURE — G2211 COMPLEX E/M VISIT ADD ON: HCPCS | Mod: ,,, | Performed by: INTERNAL MEDICINE
